# Patient Record
Sex: FEMALE | NOT HISPANIC OR LATINO | Employment: OTHER | URBAN - METROPOLITAN AREA
[De-identification: names, ages, dates, MRNs, and addresses within clinical notes are randomized per-mention and may not be internally consistent; named-entity substitution may affect disease eponyms.]

---

## 2017-07-07 ENCOUNTER — GENERIC CONVERSION - ENCOUNTER (OUTPATIENT)
Dept: OTHER | Facility: OTHER | Age: 82
End: 2017-07-07

## 2020-01-28 ENCOUNTER — HOSPITAL ENCOUNTER (INPATIENT)
Facility: HOSPITAL | Age: 85
LOS: 3 days | Discharge: HOME HEALTH | DRG: 884 | End: 2020-01-31
Attending: EMERGENCY MEDICINE | Admitting: HOSPITALIST
Payer: COMMERCIAL

## 2020-01-28 ENCOUNTER — APPOINTMENT (EMERGENCY)
Dept: RADIOLOGY | Facility: HOSPITAL | Age: 85
DRG: 884 | End: 2020-01-28
Payer: COMMERCIAL

## 2020-01-28 ENCOUNTER — APPOINTMENT (EMERGENCY)
Dept: CT IMAGING | Facility: HOSPITAL | Age: 85
DRG: 884 | End: 2020-01-28
Payer: COMMERCIAL

## 2020-01-28 DIAGNOSIS — R77.8 ELEVATED TROPONIN: ICD-10-CM

## 2020-01-28 DIAGNOSIS — R26.2 AMBULATORY DYSFUNCTION: ICD-10-CM

## 2020-01-28 DIAGNOSIS — R62.7 FAILURE TO THRIVE IN ADULT: ICD-10-CM

## 2020-01-28 DIAGNOSIS — E86.0 DEHYDRATION: ICD-10-CM

## 2020-01-28 DIAGNOSIS — R41.82 ALTERED MENTAL STATUS: Primary | ICD-10-CM

## 2020-01-28 PROBLEM — F03.90 DEMENTIA (HCC): Status: ACTIVE | Noted: 2020-01-28

## 2020-01-28 PROBLEM — I50.9 CHF (CONGESTIVE HEART FAILURE) (HCC): Status: ACTIVE | Noted: 2020-01-28

## 2020-01-28 PROBLEM — E11.9 DIABETES (HCC): Status: ACTIVE | Noted: 2020-01-28

## 2020-01-28 PROBLEM — I10 HTN (HYPERTENSION): Status: ACTIVE | Noted: 2020-01-28

## 2020-01-28 PROBLEM — N18.9 CKD (CHRONIC KIDNEY DISEASE): Status: ACTIVE | Noted: 2020-01-28

## 2020-01-28 LAB
ALBUMIN SERPL BCP-MCNC: 3.4 G/DL (ref 3.5–5)
ALP SERPL-CCNC: 71 U/L (ref 46–116)
ALT SERPL W P-5'-P-CCNC: 9 U/L (ref 12–78)
ANION GAP SERPL CALCULATED.3IONS-SCNC: 5 MMOL/L (ref 4–13)
APTT PPP: 22 SECONDS (ref 23–37)
AST SERPL W P-5'-P-CCNC: 16 U/L (ref 5–45)
ATRIAL RATE: 68 BPM
BASOPHILS # BLD AUTO: 0.02 THOUSANDS/ΜL (ref 0–0.1)
BASOPHILS NFR BLD AUTO: 0 % (ref 0–1)
BILIRUB SERPL-MCNC: 0.97 MG/DL (ref 0.2–1)
BUN SERPL-MCNC: 30 MG/DL (ref 5–25)
CALCIUM SERPL-MCNC: 9.8 MG/DL (ref 8.3–10.1)
CHLORIDE SERPL-SCNC: 103 MMOL/L (ref 100–108)
CO2 SERPL-SCNC: 32 MMOL/L (ref 21–32)
CREAT SERPL-MCNC: 1.07 MG/DL (ref 0.6–1.3)
EOSINOPHIL # BLD AUTO: 0.05 THOUSAND/ΜL (ref 0–0.61)
EOSINOPHIL NFR BLD AUTO: 1 % (ref 0–6)
ERYTHROCYTE [DISTWIDTH] IN BLOOD BY AUTOMATED COUNT: 12.4 % (ref 11.6–15.1)
GFR SERPL CREATININE-BSD FRML MDRD: 47 ML/MIN/1.73SQ M
GLUCOSE SERPL-MCNC: 198 MG/DL (ref 65–140)
GLUCOSE SERPL-MCNC: 216 MG/DL (ref 65–140)
HCT VFR BLD AUTO: 35.7 % (ref 34.8–46.1)
HGB BLD-MCNC: 11.8 G/DL (ref 11.5–15.4)
IMM GRANULOCYTES # BLD AUTO: 0.03 THOUSAND/UL (ref 0–0.2)
IMM GRANULOCYTES NFR BLD AUTO: 0 % (ref 0–2)
INR PPP: 1.02 (ref 0.84–1.19)
LYMPHOCYTES # BLD AUTO: 0.58 THOUSANDS/ΜL (ref 0.6–4.47)
LYMPHOCYTES NFR BLD AUTO: 8 % (ref 14–44)
MCH RBC QN AUTO: 32.4 PG (ref 26.8–34.3)
MCHC RBC AUTO-ENTMCNC: 33.1 G/DL (ref 31.4–37.4)
MCV RBC AUTO: 98 FL (ref 82–98)
MONOCYTES # BLD AUTO: 0.52 THOUSAND/ΜL (ref 0.17–1.22)
MONOCYTES NFR BLD AUTO: 7 % (ref 4–12)
NEUTROPHILS # BLD AUTO: 6.25 THOUSANDS/ΜL (ref 1.85–7.62)
NEUTS SEG NFR BLD AUTO: 84 % (ref 43–75)
NRBC BLD AUTO-RTO: 0 /100 WBCS
P AXIS: 36 DEGREES
PLATELET # BLD AUTO: 139 THOUSANDS/UL (ref 149–390)
PMV BLD AUTO: 10.5 FL (ref 8.9–12.7)
POTASSIUM SERPL-SCNC: 4 MMOL/L (ref 3.5–5.3)
PR INTERVAL: 122 MS
PROT SERPL-MCNC: 7.4 G/DL (ref 6.4–8.2)
PROTHROMBIN TIME: 13.5 SECONDS (ref 11.6–14.5)
QRS AXIS: -52 DEGREES
QRSD INTERVAL: 154 MS
QT INTERVAL: 432 MS
QTC INTERVAL: 459 MS
RBC # BLD AUTO: 3.64 MILLION/UL (ref 3.81–5.12)
SODIUM SERPL-SCNC: 140 MMOL/L (ref 136–145)
T WAVE AXIS: 141 DEGREES
TROPONIN I SERPL-MCNC: 0.08 NG/ML
TROPONIN I SERPL-MCNC: 0.1 NG/ML
VENTRICULAR RATE: 68 BPM
WBC # BLD AUTO: 7.45 THOUSAND/UL (ref 4.31–10.16)

## 2020-01-28 PROCEDURE — 71046 X-RAY EXAM CHEST 2 VIEWS: CPT

## 2020-01-28 PROCEDURE — 70450 CT HEAD/BRAIN W/O DYE: CPT

## 2020-01-28 PROCEDURE — 85610 PROTHROMBIN TIME: CPT | Performed by: EMERGENCY MEDICINE

## 2020-01-28 PROCEDURE — 99223 1ST HOSP IP/OBS HIGH 75: CPT | Performed by: PHYSICIAN ASSISTANT

## 2020-01-28 PROCEDURE — 36415 COLL VENOUS BLD VENIPUNCTURE: CPT | Performed by: EMERGENCY MEDICINE

## 2020-01-28 PROCEDURE — 93005 ELECTROCARDIOGRAM TRACING: CPT

## 2020-01-28 PROCEDURE — 96360 HYDRATION IV INFUSION INIT: CPT

## 2020-01-28 PROCEDURE — 82948 REAGENT STRIP/BLOOD GLUCOSE: CPT

## 2020-01-28 PROCEDURE — 85730 THROMBOPLASTIN TIME PARTIAL: CPT | Performed by: EMERGENCY MEDICINE

## 2020-01-28 PROCEDURE — 99285 EMERGENCY DEPT VISIT HI MDM: CPT | Performed by: EMERGENCY MEDICINE

## 2020-01-28 PROCEDURE — 80053 COMPREHEN METABOLIC PANEL: CPT | Performed by: EMERGENCY MEDICINE

## 2020-01-28 PROCEDURE — 99285 EMERGENCY DEPT VISIT HI MDM: CPT

## 2020-01-28 PROCEDURE — 84484 ASSAY OF TROPONIN QUANT: CPT | Performed by: PHYSICIAN ASSISTANT

## 2020-01-28 PROCEDURE — 85025 COMPLETE CBC W/AUTO DIFF WBC: CPT | Performed by: EMERGENCY MEDICINE

## 2020-01-28 PROCEDURE — 84484 ASSAY OF TROPONIN QUANT: CPT | Performed by: EMERGENCY MEDICINE

## 2020-01-28 PROCEDURE — 73610 X-RAY EXAM OF ANKLE: CPT

## 2020-01-28 PROCEDURE — 93010 ELECTROCARDIOGRAM REPORT: CPT | Performed by: INTERNAL MEDICINE

## 2020-01-28 RX ORDER — LEVOTHYROXINE SODIUM 0.05 MG/1
50 TABLET ORAL
Status: DISCONTINUED | OUTPATIENT
Start: 2020-01-29 | End: 2020-01-31 | Stop reason: HOSPADM

## 2020-01-28 RX ORDER — CYCLOSPORINE 0.5 MG/ML
1 EMULSION OPHTHALMIC 2 TIMES DAILY
COMMUNITY

## 2020-01-28 RX ORDER — DULOXETIN HYDROCHLORIDE 30 MG/1
30 CAPSULE, DELAYED RELEASE ORAL DAILY
Status: DISCONTINUED | OUTPATIENT
Start: 2020-01-29 | End: 2020-01-31 | Stop reason: HOSPADM

## 2020-01-28 RX ORDER — ATORVASTATIN CALCIUM 20 MG/1
20 TABLET, FILM COATED ORAL DAILY
COMMUNITY

## 2020-01-28 RX ORDER — LEVOTHYROXINE SODIUM 0.05 MG/1
50 TABLET ORAL DAILY
COMMUNITY

## 2020-01-28 RX ORDER — ATORVASTATIN CALCIUM 20 MG/1
20 TABLET, FILM COATED ORAL
Status: DISCONTINUED | OUTPATIENT
Start: 2020-01-29 | End: 2020-01-31 | Stop reason: HOSPADM

## 2020-01-28 RX ORDER — CARVEDILOL 3.12 MG/1
3.12 TABLET ORAL 2 TIMES DAILY WITH MEALS
COMMUNITY

## 2020-01-28 RX ORDER — ACETAMINOPHEN 325 MG/1
650 TABLET ORAL EVERY 6 HOURS PRN
Status: DISCONTINUED | OUTPATIENT
Start: 2020-01-28 | End: 2020-01-31 | Stop reason: HOSPADM

## 2020-01-28 RX ORDER — FERROUS SULFATE 325(65) MG
325 TABLET ORAL
COMMUNITY

## 2020-01-28 RX ORDER — ASPIRIN 81 MG/1
81 TABLET, CHEWABLE ORAL DAILY
COMMUNITY

## 2020-01-28 RX ORDER — ONDANSETRON 2 MG/ML
4 INJECTION INTRAMUSCULAR; INTRAVENOUS EVERY 6 HOURS PRN
Status: DISCONTINUED | OUTPATIENT
Start: 2020-01-28 | End: 2020-01-31 | Stop reason: HOSPADM

## 2020-01-28 RX ORDER — DULOXETIN HYDROCHLORIDE 30 MG/1
30 CAPSULE, DELAYED RELEASE ORAL DAILY
COMMUNITY

## 2020-01-28 RX ORDER — CALCITRIOL 0.25 UG/1
0.25 CAPSULE, LIQUID FILLED ORAL 3 TIMES WEEKLY
COMMUNITY

## 2020-01-28 RX ORDER — PANTOPRAZOLE SODIUM 20 MG/1
20 TABLET, DELAYED RELEASE ORAL
Status: DISCONTINUED | OUTPATIENT
Start: 2020-01-29 | End: 2020-01-31 | Stop reason: HOSPADM

## 2020-01-28 RX ORDER — CALCITRIOL 0.25 UG/1
0.25 CAPSULE, LIQUID FILLED ORAL 3 TIMES WEEKLY
Status: DISCONTINUED | OUTPATIENT
Start: 2020-01-29 | End: 2020-01-31 | Stop reason: HOSPADM

## 2020-01-28 RX ORDER — ASPIRIN 81 MG/1
81 TABLET, CHEWABLE ORAL DAILY
Status: DISCONTINUED | OUTPATIENT
Start: 2020-01-29 | End: 2020-01-31 | Stop reason: HOSPADM

## 2020-01-28 RX ORDER — FERROUS SULFATE 325(65) MG
325 TABLET ORAL
Status: DISCONTINUED | OUTPATIENT
Start: 2020-01-28 | End: 2020-01-31 | Stop reason: HOSPADM

## 2020-01-28 RX ORDER — CARVEDILOL 3.12 MG/1
3.12 TABLET ORAL 2 TIMES DAILY WITH MEALS
Status: DISCONTINUED | OUTPATIENT
Start: 2020-01-29 | End: 2020-01-31 | Stop reason: HOSPADM

## 2020-01-28 RX ORDER — OMEPRAZOLE 20 MG/1
20 CAPSULE, DELAYED RELEASE ORAL
COMMUNITY

## 2020-01-28 RX ORDER — FUROSEMIDE 10 MG/ML
SOLUTION ORAL 3 TIMES WEEKLY
COMMUNITY
End: 2020-01-31 | Stop reason: HOSPADM

## 2020-01-28 RX ADMIN — DEXTRAN 70 AND HYPROMELLOSE 2910 1 DROP: 1; 3 SOLUTION/ DROPS OPHTHALMIC at 22:00

## 2020-01-28 RX ADMIN — FERROUS SULFATE TAB 325 MG (65 MG ELEMENTAL FE) 325 MG: 325 (65 FE) TAB at 22:44

## 2020-01-28 RX ADMIN — SODIUM CHLORIDE 500 ML: 0.9 INJECTION, SOLUTION INTRAVENOUS at 19:20

## 2020-01-29 LAB
ANION GAP SERPL CALCULATED.3IONS-SCNC: 8 MMOL/L (ref 4–13)
ATRIAL RATE: 66 BPM
BUN SERPL-MCNC: 26 MG/DL (ref 5–25)
CALCIUM SERPL-MCNC: 9.5 MG/DL (ref 8.3–10.1)
CHLORIDE SERPL-SCNC: 106 MMOL/L (ref 100–108)
CO2 SERPL-SCNC: 33 MMOL/L (ref 21–32)
CREAT SERPL-MCNC: 1 MG/DL (ref 0.6–1.3)
ERYTHROCYTE [DISTWIDTH] IN BLOOD BY AUTOMATED COUNT: 12.3 % (ref 11.6–15.1)
EST. AVERAGE GLUCOSE BLD GHB EST-MCNC: 117 MG/DL
GFR SERPL CREATININE-BSD FRML MDRD: 51 ML/MIN/1.73SQ M
GLUCOSE SERPL-MCNC: 116 MG/DL (ref 65–140)
GLUCOSE SERPL-MCNC: 130 MG/DL (ref 65–140)
GLUCOSE SERPL-MCNC: 133 MG/DL (ref 65–140)
GLUCOSE SERPL-MCNC: 136 MG/DL (ref 65–140)
GLUCOSE SERPL-MCNC: 205 MG/DL (ref 65–140)
GLUCOSE SERPL-MCNC: 430 MG/DL (ref 65–140)
GLUCOSE SERPL-MCNC: 93 MG/DL (ref 65–140)
HBA1C MFR BLD: 5.7 % (ref 4.2–6.3)
HCT VFR BLD AUTO: 32.8 % (ref 34.8–46.1)
HGB BLD-MCNC: 10.7 G/DL (ref 11.5–15.4)
MCH RBC QN AUTO: 31.9 PG (ref 26.8–34.3)
MCHC RBC AUTO-ENTMCNC: 32.6 G/DL (ref 31.4–37.4)
MCV RBC AUTO: 98 FL (ref 82–98)
P AXIS: 64 DEGREES
PLATELET # BLD AUTO: 118 THOUSANDS/UL (ref 149–390)
PMV BLD AUTO: 10.2 FL (ref 8.9–12.7)
POTASSIUM SERPL-SCNC: 3.5 MMOL/L (ref 3.5–5.3)
PR INTERVAL: 122 MS
QRS AXIS: -60 DEGREES
QRSD INTERVAL: 154 MS
QT INTERVAL: 472 MS
QTC INTERVAL: 494 MS
RBC # BLD AUTO: 3.35 MILLION/UL (ref 3.81–5.12)
SODIUM SERPL-SCNC: 147 MMOL/L (ref 136–145)
T WAVE AXIS: 121 DEGREES
TROPONIN I SERPL-MCNC: 0.09 NG/ML
TSH SERPL DL<=0.05 MIU/L-ACNC: 0.41 UIU/ML (ref 0.36–3.74)
VENTRICULAR RATE: 66 BPM
WBC # BLD AUTO: 6.22 THOUSAND/UL (ref 4.31–10.16)

## 2020-01-29 PROCEDURE — 80048 BASIC METABOLIC PNL TOTAL CA: CPT | Performed by: STUDENT IN AN ORGANIZED HEALTH CARE EDUCATION/TRAINING PROGRAM

## 2020-01-29 PROCEDURE — 92610 EVALUATE SWALLOWING FUNCTION: CPT

## 2020-01-29 PROCEDURE — 99232 SBSQ HOSP IP/OBS MODERATE 35: CPT | Performed by: HOSPITALIST

## 2020-01-29 PROCEDURE — 97167 OT EVAL HIGH COMPLEX 60 MIN: CPT

## 2020-01-29 PROCEDURE — 82948 REAGENT STRIP/BLOOD GLUCOSE: CPT

## 2020-01-29 PROCEDURE — 97163 PT EVAL HIGH COMPLEX 45 MIN: CPT

## 2020-01-29 PROCEDURE — 85027 COMPLETE CBC AUTOMATED: CPT | Performed by: STUDENT IN AN ORGANIZED HEALTH CARE EDUCATION/TRAINING PROGRAM

## 2020-01-29 PROCEDURE — 99223 1ST HOSP IP/OBS HIGH 75: CPT | Performed by: INTERNAL MEDICINE

## 2020-01-29 PROCEDURE — 83036 HEMOGLOBIN GLYCOSYLATED A1C: CPT | Performed by: STUDENT IN AN ORGANIZED HEALTH CARE EDUCATION/TRAINING PROGRAM

## 2020-01-29 PROCEDURE — 93010 ELECTROCARDIOGRAM REPORT: CPT | Performed by: INTERNAL MEDICINE

## 2020-01-29 PROCEDURE — 84443 ASSAY THYROID STIM HORMONE: CPT | Performed by: STUDENT IN AN ORGANIZED HEALTH CARE EDUCATION/TRAINING PROGRAM

## 2020-01-29 PROCEDURE — 84484 ASSAY OF TROPONIN QUANT: CPT | Performed by: PHYSICIAN ASSISTANT

## 2020-01-29 PROCEDURE — 97535 SELF CARE MNGMENT TRAINING: CPT

## 2020-01-29 RX ADMIN — DEXTRAN 70 AND HYPROMELLOSE 2910 1 DROP: 1; 3 SOLUTION/ DROPS OPHTHALMIC at 08:53

## 2020-01-29 RX ADMIN — ENOXAPARIN SODIUM 40 MG: 40 INJECTION SUBCUTANEOUS at 08:53

## 2020-01-29 RX ADMIN — PANTOPRAZOLE SODIUM 20 MG: 20 TABLET, DELAYED RELEASE ORAL at 05:53

## 2020-01-29 RX ADMIN — INSULIN LISPRO 1 UNITS: 100 INJECTION, SOLUTION INTRAVENOUS; SUBCUTANEOUS at 12:42

## 2020-01-29 RX ADMIN — LEVOTHYROXINE SODIUM 50 MCG: 50 TABLET ORAL at 05:51

## 2020-01-29 NOTE — ASSESSMENT & PLAN NOTE
Lab Results   Component Value Date    HGBA1C 6 7 (H) 11/05/2014       Recent Labs     01/28/20  1823   POCGLU 198*       Blood Sugar Average: Last 72 hrs:  (P) 198     Order accuchecks with sliding scale  Watch closely for hypoglycemia with recent decreased PO intake

## 2020-01-29 NOTE — ED NOTES
SLIM Provider at bedside speaking with patient and patient's family (Daughter)     James Mari RN  01/28/20 2017

## 2020-01-29 NOTE — PLAN OF CARE
Problem: PHYSICAL THERAPY ADULT  Goal: Performs mobility at highest level of function for planned discharge setting  See evaluation for individualized goals  Description  Treatment/Interventions: Functional transfer training, LE strengthening/ROM, Elevations, Therapeutic exercise, Endurance training, Patient/family training, Equipment eval/education, Bed mobility, Gait training, Compensatory technique education, Continued evaluation, Spoke to nursing, OT  Equipment Recommended: (monitor)       See flowsheet documentation for full assessment, interventions and recommendations  Note:   Prognosis: Guarded  Problem List: Decreased strength, Decreased range of motion, Decreased endurance, Impaired balance, Decreased mobility, Decreased cognition, Decreased coordination, Impaired judgement, Decreased safety awareness, Impaired hearing, Pain, Decreased skin integrity  Assessment: Pt is 81 y/o female admitted with change in mental status, weakness, decreased appetite  Failure to thrive  PT consulted  Up with assist orders  Prior to admission resides with daughter and son in law in split level home with 4 steps between each floor  Up until Last Wednesday leading to admission significant decline in mobility to being unable to ambulation  Prior was ambulating in home with RW support unassisted  Able to negotiate stairs  A for ADLS  Hx of 2 falls  Ambulated approx 100 ft/day  Now with significant functional limitations observed given impaired cognition, decreased activity tolerance, balance, strength and overall mobility  Max A of 2 for bed mobility and transfers sit to stand  Standing tolerance with RW limited to less than 5 seconds  Heavily retropulsive in standing with LE tremulousness observed  Appears fearful with attempt at mobility  Unable to progress with ambulation at this time given weakness  Would recommend continued PT in order to optimize functional outcomes while lessening caregiver assistance  Rehab would be beneficial given significance of impairments, however family preference to care for pt in home  Will follow and progress towards goals  Barriers to Discharge: Inaccessible home environment  Barriers to Discharge Comments: can have first floor set up  Recommendation: Short-term skilled PT(rec continued rehab  Family preference home  )     PT - OK to Discharge: No(to home, yes if to rhb )    See flowsheet documentation for full assessment

## 2020-01-29 NOTE — PLAN OF CARE
Problem: OCCUPATIONAL THERAPY ADULT  Goal: Performs self-care activities at highest level of function for planned discharge setting  See evaluation for individualized goals  Description  Treatment Interventions: ADL retraining, Functional transfer training, UE strengthening/ROM, Endurance training, Cognitive reorientation, Patient/family training, Fine motor coordination activities, Compensatory technique education, Equipment evaluation/education, Activityengagement          See flowsheet documentation for full assessment, interventions and recommendations  Note:   Limitation: Decreased ADL status, Decreased UE strength, Decreased Safe judgement during ADL, Decreased cognition, Decreased UE ROM, Decreased endurance, Visual deficit, Decreased fine motor control, Decreased high-level ADLs, Decreased self-care trans  Prognosis: Fair  Assessment: Pt is an 81 y/o F admitted to the hospital 2* symptoms of right sided lean, altered mental status, and inability to get out of bed  Pt noted with elevated troponins  Pt with a PMH of cognitive decline, falls (with rib fractures), and multiple hospitalizations 2* failure to thrive  PTA family (i e  daughter) states pt had assistance with her ADLs; family states pt was I with her transfers and ambulation- with RW; daughter states recent decline (since last wednesday) in function after her fall; HHA 2x/wk; daughter plans to provide 24 hour care upon discharge  During initial eval, pt demonstrated deficits with her funcitonal balance, funcitonal mobility, ADL status, transfer safety, B/L UE strength, functional activity tolerance (current = poor 5-10 minutes), and cognition (i e  orientation, problem solving, judgment and safety, and direction following)  Pt would benefit frrom continued OT tx for the above stated deficits 3-5 x/wk for 1-2 wks        OT Discharge Recommendation: Short Term Rehab(Daughter prefers home with increased home services)

## 2020-01-29 NOTE — HOSPICE NOTE
Hospice referral received  Spoke with Dr Roberto Carlos Hall and MADAY Jordan  Will reach out to patient's daughter  Thank you

## 2020-01-29 NOTE — ED PROVIDER NOTES
History  Chief Complaint   Patient presents with    Altered Mental Status     Per EMS,patient was sent from home  Family reports that the patient has not eaten today and appears to be more confused than her baseline, hx  of dementia  Family reports fall one week ago  History provided by:  Patient   used: No    Altered Mental Status   Presenting symptoms: confusion    Severity:  Moderate  Most recent episode:  More than 2 days ago  Episode history:  Multiple  Duration:  1 week  Timing:  Intermittent  Progression:  Worsening  Chronicity:  New  Context: dementia    Context: not nursing home resident    Associated symptoms: no abdominal pain, no agitation, no fever, no headaches, no light-headedness, no nausea, no rash, no slurred speech and no vomiting        Prior to Admission Medications   Prescriptions Last Dose Informant Patient Reported? Taking?    DULoxetine (CYMBALTA) 30 mg delayed release capsule   Yes Yes   Sig: Take 30 mg by mouth daily   aspirin 81 mg chewable tablet   Yes Yes   Sig: Chew 81 mg daily   atorvastatin (LIPITOR) 20 mg tablet   Yes Yes   Sig: Take 20 mg by mouth daily   calcitriol (ROCALTROL) 0 25 mcg capsule   Yes Yes   Sig: Take 0 25 mcg by mouth 3 (three) times a week   carvedilol (COREG) 3 125 mg tablet   Yes Yes   Sig: Take 3 125 mg by mouth 2 (two) times a day with meals   cycloSPORINE (RESTASIS) 0 05 % ophthalmic emulsion   Yes Yes   Si drop 2 (two) times a day   ferrous sulfate 325 (65 Fe) mg tablet   Yes Yes   Sig: Take 325 mg by mouth daily at bedtime   furosemide (LASIX) 10 mg/mL oral solution   Yes Yes   Sig: Take by mouth 3 (three) times a week   levothyroxine 50 mcg tablet   Yes Yes   Sig: Take 50 mcg by mouth daily   magnesium oxide (MAG-OX) 400 mg   Yes Yes   Sig: Take 400 mg by mouth daily after breakfast   omeprazole (PriLOSEC) 20 mg delayed release capsule   Yes Yes   Sig: Take 20 mg by mouth every other day   sitaGLIPtin (JANUVIA) 50 mg tablet Yes Yes   Sig: Take 50 mg by mouth daily      Facility-Administered Medications: None       Past Medical History:   Diagnosis Date    Pacemaker        History reviewed  No pertinent surgical history  History reviewed  No pertinent family history  I have reviewed and agree with the history as documented  Social History     Tobacco Use    Smoking status: Never Smoker    Smokeless tobacco: Never Used   Substance Use Topics    Alcohol use: Never     Frequency: Never    Drug use: Never        Review of Systems   Constitutional: Negative for chills and fever  HENT: Negative for facial swelling, sore throat and trouble swallowing  Eyes: Negative for pain and visual disturbance  Respiratory: Negative for cough and shortness of breath  Cardiovascular: Negative for chest pain and leg swelling  Gastrointestinal: Negative for abdominal pain, blood in stool, diarrhea, nausea and vomiting  Genitourinary: Negative for dysuria and flank pain  Musculoskeletal: Negative for back pain, neck pain and neck stiffness  Skin: Negative for pallor and rash  Allergic/Immunologic: Negative for environmental allergies and immunocompromised state  Neurological: Negative for dizziness, light-headedness and headaches  Hematological: Negative for adenopathy  Does not bruise/bleed easily  Psychiatric/Behavioral: Positive for confusion  Negative for agitation and behavioral problems  All other systems reviewed and are negative  Physical Exam  Physical Exam   Constitutional: She is oriented to person, place, and time  She appears well-developed and well-nourished  No distress  HENT:   Head: Normocephalic and atraumatic  Eyes: EOM are normal    Neck: Normal range of motion  Neck supple  Cardiovascular: Normal rate, regular rhythm, normal heart sounds and intact distal pulses  Pulmonary/Chest: Effort normal and breath sounds normal    Abdominal: Soft  Bowel sounds are normal  There is no tenderness  There is no rebound and no guarding  Musculoskeletal: Normal range of motion  Neurological: She is alert and oriented to person, place, and time  No cranial nerve deficit  Coordination normal    Skin: Skin is warm and dry  Psychiatric: She has a normal mood and affect  Nursing note and vitals reviewed        Vital Signs  ED Triage Vitals [01/28/20 1736]   Temperature Pulse Respirations Blood Pressure SpO2   97 6 °F (36 4 °C) 67 16 127/71 (!) 88 %      Temp Source Heart Rate Source Patient Position - Orthostatic VS BP Location FiO2 (%)   Oral Monitor Lying Right arm --      Pain Score       No Pain           Vitals:    01/28/20 1736 01/28/20 2037 01/28/20 2114   BP: 127/71 (!) 183/74 (!) 175/74   Pulse: 67 68 59   Patient Position - Orthostatic VS: Lying Lying          Visual Acuity  Visual Acuity      Most Recent Value   L Pupil Size (mm)  2   R Pupil Size (mm)  2          ED Medications  Medications   aspirin chewable tablet 81 mg (has no administration in time range)   atorvastatin (LIPITOR) tablet 20 mg (has no administration in time range)   calcitriol (ROCALTROL) capsule 0 25 mcg (has no administration in time range)   carvedilol (COREG) tablet 3 125 mg (has no administration in time range)   dextran 70-hypromellose (GENTEAL TEARS) 0 1-0 3 % ophthalmic solution 1 drop (has no administration in time range)   DULoxetine (CYMBALTA) delayed release capsule 30 mg (has no administration in time range)   ferrous sulfate tablet 325 mg (has no administration in time range)   levothyroxine tablet 50 mcg (has no administration in time range)   magnesium oxide (MAG-OX) tablet 400 mg (has no administration in time range)   pantoprazole (PROTONIX) EC tablet 20 mg (has no administration in time range)   ondansetron (ZOFRAN) injection 4 mg (has no administration in time range)   enoxaparin (LOVENOX) subcutaneous injection 40 mg (has no administration in time range)   acetaminophen (TYLENOL) tablet 650 mg (has no administration in time range)   insulin lispro (HumaLOG) 100 units/mL subcutaneous injection 1-5 Units (has no administration in time range)   insulin lispro (HumaLOG) 100 units/mL subcutaneous injection 1-5 Units (has no administration in time range)   sodium chloride 0 9 % bolus 500 mL (500 mL Intravenous New Bag 1/28/20 1920)       Diagnostic Studies  Results Reviewed     Procedure Component Value Units Date/Time    Protime-INR [[id number]]  (Normal) Collected:  01/28/20 1836    Lab Status:  Final result Specimen:  Blood from Arm, Left Updated:  01/28/20 1910     Protime 13 5 seconds      INR 1 02    APTT [[id number]]  (Abnormal) Collected:  01/28/20 1836    Lab Status:  Final result Specimen:  Blood from Arm, Left Updated:  01/28/20 1910     PTT 22 seconds     Comprehensive metabolic panel [[id number]]  (Abnormal) Collected:  01/28/20 1836    Lab Status:  Final result Specimen:  Blood from Arm, Left Updated:  01/28/20 1901     Sodium 140 mmol/L      Potassium 4 0 mmol/L      Chloride 103 mmol/L      CO2 32 mmol/L      ANION GAP 5 mmol/L      BUN 30 mg/dL      Creatinine 1 07 mg/dL      Glucose 216 mg/dL      Calcium 9 8 mg/dL      AST 16 U/L      ALT 9 U/L      Alkaline Phosphatase 71 U/L      Total Protein 7 4 g/dL      Albumin 3 4 g/dL      Total Bilirubin 0 97 mg/dL      eGFR 47 ml/min/1 73sq m     Narrative:       Tyrone guidelines for Chronic Kidney Disease (CKD):     Stage 1 with normal or high GFR (GFR > 90 mL/min/1 73 square meters)    Stage 2 Mild CKD (GFR = 60-89 mL/min/1 73 square meters)    Stage 3A Moderate CKD (GFR = 45-59 mL/min/1 73 square meters)    Stage 3B Moderate CKD (GFR = 30-44 mL/min/1 73 square meters)    Stage 4 Severe CKD (GFR = 15-29 mL/min/1 73 square meters)    Stage 5 End Stage CKD (GFR <15 mL/min/1 73 square meters)  Note: GFR calculation is accurate only with a steady state creatinine    Troponin I [[id number]]  (Abnormal) Collected:  01/28/20 1836 Lab Status:  Final result Specimen:  Blood from Arm, Left Updated:  01/28/20 1900     Troponin I 0 08 ng/mL     CBC and differential [[id number]]  (Abnormal) Collected:  01/28/20 1836    Lab Status:  Final result Specimen:  Blood from Arm, Left Updated:  01/28/20 1841     WBC 7 45 Thousand/uL      RBC 3 64 Million/uL      Hemoglobin 11 8 g/dL      Hematocrit 35 7 %      MCV 98 fL      MCH 32 4 pg      MCHC 33 1 g/dL      RDW 12 4 %      MPV 10 5 fL      Platelets 790 Thousands/uL      nRBC 0 /100 WBCs      Neutrophils Relative 84 %      Immat GRANS % 0 %      Lymphocytes Relative 8 %      Monocytes Relative 7 %      Eosinophils Relative 1 %      Basophils Relative 0 %      Neutrophils Absolute 6 25 Thousands/µL      Immature Grans Absolute 0 03 Thousand/uL      Lymphocytes Absolute 0 58 Thousands/µL      Monocytes Absolute 0 52 Thousand/µL      Eosinophils Absolute 0 05 Thousand/µL      Basophils Absolute 0 02 Thousands/µL     Fingerstick Glucose (POCT) [[id number]]  (Abnormal) Collected:  01/28/20 1823    Lab Status:  Final result Updated:  01/28/20 1825     POC Glucose 198 mg/dl     POCT urinalysis dipstick [[id number]]     Lab Status:  No result Specimen:  Urine                  CT head without contrast   Final Result by Anup 6, DO (01/28 1938)      No acute intracranial abnormality  Microangiopathic changes  Workstation performed: [id number]         XR chest 2 views    (Results Pending)   XR ankle 3+ vw right    (Results Pending)   X-rays reviewed, no significant acute abnormality noted, report pending             Procedures  ECG 12 Lead Documentation Only  Date/Time: 1/28/2020 7:25 PM  Performed by: Hilda Reyes MD  Authorized by: Hilda Reyes MD     Indications / Diagnosis:  Confusion  ECG reviewed by me, the ED Provider: yes    Patient location:  ED  Interpretation:     Interpretation: normal    Rate:     ECG rate assessment: normal    Rhythm:     Rhythm: sinus rhythm Ectopy:     Ectopy: none    QRS:     QRS axis:  Normal  Conduction:     Conduction: normal    ST segments:     ST segments:  Normal  T waves:     T waves: normal               ED Course  ED Course as of Jan 28 2143 Tue Jan 28, 2020 1918 Positive trop noted  Troponin I(!): 0 08   1923 Chest x-ray and x-ray ankle reviewed, no acute abnormality noted, known right-sided rib fractures noted  MDM  Number of Diagnoses or Management Options  Altered mental status: new and requires workup  Ambulatory dysfunction: new and requires workup  Dehydration: new and requires workup  Elevated troponin: new and requires workup  Failure to thrive in adult: new and requires workup  Diagnosis management comments: 30-year-old female, brought in from home, complaints of confusion, generalized weakness, failure to thrive, loss of appetite, fall down a few days back, has known right-sided fractures; patient and family denies headache, chest pain, dyspnea, fever or any other illness; however concerned about dehydration due to not eating drinking  On exam patient is conscious, alert, no focal neuro deficits, vital signs stable, afebrile, lungs clear, heart sounds normal, abdomen soft nontender  Differential diagnosis:  Failure to thrive, rule out dehydration, rule out intracranial hemorrhage due to recent fall, UTI, electrolyte imbalance  Will check labs, EKG, CT head, chest x-ray, patient also has reported right ankle swelling, will get x-ray  Amount and/or Complexity of Data Reviewed  Clinical lab tests: reviewed and ordered  Tests in the radiology section of CPT®: ordered and reviewed  Tests in the medicine section of CPT®: ordered and reviewed  Discuss the patient with other providers: yes  Independent visualization of images, tracings, or specimens: yes          Disposition  Final diagnoses:    Altered mental status   Failure to thrive in adult   Dehydration   Ambulatory dysfunction Elevated troponin     Time reflects when diagnosis was documented in both MDM as applicable and the Disposition within this note     Time User Action Codes Description Comment    1/28/2020  7:38 PM Ryan Antoni Add [R41 82] Altered mental status     1/28/2020  7:38 PM Ryan Antoni Add [R62 51] Failure to thrive (0-17)     1/28/2020  7:38 PM Ryan Antoni Remove [R62 51] Failure to thrive (0-17)     1/28/2020  7:38 PM Ryan Antoni Add [R62 7] Failure to thrive in adult     1/28/2020  7:38 PM Ryan Antoni Add [E86 0] Dehydration     1/28/2020  7:38 PM Ryan Antoni Add [R26 2] Ambulatory dysfunction     1/28/2020  9:12 PM Shmuel SNYDER Modify [R41 82] Altered mental status     1/28/2020  9:43 PM Ryan Antoni Add [R79 89] Elevated troponin       ED Disposition     ED Disposition Condition Date/Time Comment    Admit Stable Tue Jan 28, 2020  7:54 PM Case was discussed with Dr Austin Slater and the patient's admission status was agreed to be Admission Status: inpatient status to the service of Dr Austin Slater         Follow-up Information    None         Current Discharge Medication List      CONTINUE these medications which have NOT CHANGED    Details   aspirin 81 mg chewable tablet Chew 81 mg daily      atorvastatin (LIPITOR) 20 mg tablet Take 20 mg by mouth daily      calcitriol (ROCALTROL) 0 25 mcg capsule Take 0 25 mcg by mouth 3 (three) times a week      carvedilol (COREG) 3 125 mg tablet Take 3 125 mg by mouth 2 (two) times a day with meals      cycloSPORINE (RESTASIS) 0 05 % ophthalmic emulsion 1 drop 2 (two) times a day      DULoxetine (CYMBALTA) 30 mg delayed release capsule Take 30 mg by mouth daily      ferrous sulfate 325 (65 Fe) mg tablet Take 325 mg by mouth daily at bedtime      furosemide (LASIX) 10 mg/mL oral solution Take by mouth 3 (three) times a week      levothyroxine 50 mcg tablet Take 50 mcg by mouth daily      magnesium oxide (MAG-OX) 400 mg Take 400 mg by mouth daily after breakfast omeprazole (PriLOSEC) 20 mg delayed release capsule Take 20 mg by mouth every other day      sitaGLIPtin (JANUVIA) 50 mg tablet Take 50 mg by mouth daily           No discharge procedures on file      ED Provider  Electronically Signed by           Jemal Gibbs MD  01/28/20 8663

## 2020-01-29 NOTE — SOCIAL WORK
Cm notified by palliative care daughter/POA requests OP Hospice eval by [facility]- referral made  Will follow up with recommendation

## 2020-01-29 NOTE — PROGRESS NOTES
Progress Note - Ian Kelsey 1933, 80 y o  female MRN: [id number]    Unit/Bed#: [facility] 68 2 -02 Encounter: [id number]    Primary Care Provider: No primary care provider on file  Date and time admitted to hospital: 2020  5:25 PM        * Altered mental status  Assessment & Plan  Likely worsening dementia and dehydration  Looking at hospice          Subjective:   No complaints  Just tired  No appetite  Objective:     Vitals:   Temp (24hrs), Av 9 °F (36 6 °C), Min:97 6 °F (36 4 °C), Max:98 3 °F (36 8 °C)    Temp:  [97 6 °F (36 4 °C)-98 3 °F (36 8 °C)] 98 °F (36 7 °C)  HR:  [59-70] 63  Resp:  [16-18] 16  BP: (127-183)/(61-74) 156/65  SpO2:  [88 %-100 %] 96 %  There is no height or weight on file to calculate BMI  Input and Output Summary (last 24 hours):     No intake or output data in the 24 hours ending 20 1604    Physical Exam:     Physical Exam   HENT:   Head: Normocephalic and atraumatic  Eyes: Pupils are equal, round, and reactive to light  EOM are normal    Cardiovascular: Normal rate and regular rhythm  Exam reveals no gallop and no friction rub  No murmur heard  Pulmonary/Chest: Effort normal and breath sounds normal  She has no wheezes  She has no rales  Abdominal: Soft  Bowel sounds are normal  There is no tenderness  Musculoskeletal: She exhibits no edema  Nursing note and vitals reviewed              Additional Data:     Labs:    Results from last 7 days   Lab Units 20  0342 20  1836   WBC Thousand/uL 6 22 7 45   HEMOGLOBIN g/dL 10 7* 11 8   HEMATOCRIT % 32 8* 35 7   PLATELETS Thousands/uL 118* 139*   NEUTROS PCT %  --  84*   LYMPHS PCT %  --  8*   MONOS PCT %  --  7   EOS PCT %  --  1     Results from last 7 days   Lab Units 20  0342 20  1836   POTASSIUM mmol/L 3 5 4 0   CHLORIDE mmol/L 106 103   CO2 mmol/L 33* 32   BUN mg/dL 26* 30*   CREATININE mg/dL 1 00 1 07   CALCIUM mg/dL 9 5 9 8   ALK PHOS U/L  --  71   ALT U/L  --  9* AST U/L  --  16     Results from last 7 days   Lab Units 01/28/20  1836   INR  1 02     Results from last 7 days   Lab Units 01/29/20  1555 01/29/20  1112 01/29/20  1056 01/29/20  0708 01/28/20  2236 01/28/20  1823   POC GLUCOSE mg/dl 130 205* 430* 136 116 198*     Results from last 7 days   Lab Units 01/29/20  0345   HEMOGLOBIN A1C % 5 7           * I Have Reviewed All Lab Data     Recent Cultures (last 7 days):             Last 24 Hours Medication List:     Current Facility-Administered Medications:  acetaminophen 650 mg Oral Q6H PRN Sharmon Glaze, PA-C   aspirin 81 mg Oral Daily Sharmon Glaze, PA-C   atorvastatin 20 mg Oral Daily With [employer], PA-C   calcitriol 0 25 mcg Oral Once per day on Mon Wed Fri Sharmon Glaze, PA-C   carvedilol 3 125 mg Oral BID With Meals Sharmon Glaze, PA-C   dextran 70-hypromellose 1 drop Both Eyes Q12H [facility] Sharmon Glaze, PA-C   DULoxetine 30 mg Oral Daily Sharmon Glaze, PA-C   enoxaparin 40 mg Subcutaneous Daily Sharmon Glaze, PA-C   ferrous sulfate 325 mg Oral HS Sharmon Glaze, PA-C   insulin lispro 1-5 Units Subcutaneous TID AC Elisabeth Calvo, PA-C   insulin lispro 1-5 Units Subcutaneous HS Sharmon Glaze, PA-C   levothyroxine 50 mcg Oral Early Morning Sharmon Glaze, PA-C   magnesium oxide 400 mg Oral After Breakfast Sharmon Glaze, PA-C   ondansetron 4 mg Intravenous Q6H PRN Sharmon Glaze, PA-C   pantoprazole 20 mg Oral Early Morning Sharmon Glaze, PA-C         VTE Pharmacologic Prophylaxis:   Pharmacologic: Enoxaparin (Lovenox)      Current Length of Stay: 1 day(s)    Current Patient Status: Inpatient       Discharge Plan: hospice    Code Status: Level 3 - DNAR and DNI           Today, Patient Was Seen By: Sonia Heard DO    ** Please Note: Dictation voice to text software may have been used in the creation of this document   **

## 2020-01-29 NOTE — SPEECH THERAPY NOTE
Speech Language/Pathology  Speech/Language Pathology  Assessment    Patient Name: Calvin Garnett  YCMOQ'K Date: 1/29/2020     Problem List  Principal Problem:    Altered mental status  Active Problems:    CHF (congestive heart failure) (HCC)    CKD (chronic kidney disease)    HTN (hypertension)    Dementia ([facility] [address] )    Diabetes ([facility] [address] )    Past Medical History  Past Medical History:   Diagnosis Date    Pacemaker      Past Surgical History  History reviewed  No pertinent surgical history  Bedside Swallow Evaluation:    Summary:  Pt presents w/ limited verbalizations  Alert but did not always respond to questions  Tolerated small amts jello, puree, nectar, and controlled small sips of water wfl  Inconsistent w/ use of straw  Tends to hold the bolus in her mouth  Needs cues to swallow and visual cue of next presentation coming towards her lips to help elicit a transfer and swallow  Noted the center of her tongue appeared black  nurse reported no iron or dark meds given here  Recommendations:  Diet: puree  Liquid: nectar  Meds: crush  Supervision:  Positioning:Upright  Strategies: Pt to take PO/Meds only when fully alert and upright  Make sure she swallows after each bite  Foods should be flavorful and warm or cold  Not room temp  Oral care  Aspiration precautions  Reflux precautions  Moderate hiatal hernia per CT at [facility]    Therapy Prognosis: unknown  Prognosis considerations: ? Decline due to dementia  Frequency: 2-5x/week    Goal(s):  Pt will tolerate least restrictive diet w/out s/s aspiration or oral/pharyngeal difficulties  Pt will transfer and swallow each bolus in ,<15 seconds w/ tactile, visual cues and gustatory stim  Pt will tolerate nectar liquids-->thin  liquids w/out s/s aspiration       Patient's goal:unable to state    Consider consult w/:  Nutrition, supplements    Reason for consult:  R/o aspiration  Determine safest and least restrictive diet  H/o neurological disease  Nursing reported cough w/ water  poor intake  weight loss   h/o dysphagia   Current diet:  none  Premorbid diet[de-identified]  Diabetic diet w/ no texture modification per d/c report from [facility] 1/14/20  Previous VBS:  None known  O2 requirement:  none  Voice/Speech:  Minimal speech was clear  "I don't like that"  Social:  Home w/ daughter and kavin  Follows commands:  Inconsistent w/ basic commands  Cognitive Status:  Alert, confused  Oral [facility] exam:  Dentition: partial natural  Partial upper plate  Labial strength and ROM:adequate  Lingual strength and ROM:adequate  Mandibular strength and ROM: reduced w/ jello  Secretion management:wnl  Volitional cough:wnl    Items administered:  Noted above  Oral stage: mod  Lip closure:inconsistent w/ straw use  Mastication:reduced w/ jello  Bolus formation:fair  Bolus control:appeared adequate today  Transfer:variable delay  Prompt to > 20 seconds  Oral residue:none visible  Pocketing:none    Pharyngeal stage: ? Mild  Swallow promptness: prompt to mild delay  Laryngeal rise:fair  Wet voice:no  Throat clear:no  Cough:no  Secondary swallows:none  Audible swallows:no  No overt s/s aspiration    Esophageal stage: Mod hiatal hernia per [facility] report  Aspiration precautions posted    Results d/w:  Pt, nursing                      Per H&P  Chief Complaint:   Failure to thrive  History of Present Illness:  Gilford Moan is a 80 y o  female who presents with failure to thrive  Patient was brought in by daughter and son in law  Patient normally follows at [address] and had 2 recent hospitalizations there for similar complaints  Complaints include: generalized weakness, worsening confusion, decreased appetite  Family states they brought her here because they thought the wait in the ED would be shorter and in the past she received kind care from the nurses here  Daughter states that although the symptoms are the same, they seem to be getting worse  Today patient was unable to get out of bed   She kept leaning to the right or backward because she was too weak to sit up  She seemed to confused or unable to focus enough to eat  She was just letting the food sit in her mouth  She did fall last Wednesday  She had fallen in the past and sustained rib fractures but the daughter states she doesn't complain about that anymore  She had an episode at [facility] where she had garbled speech and had an MRI which was negative for stroke  Daughter is caring for the patient at home  She currently has a part time aid at home  After her last hospitalization at [address] [place] [address] she was referred to outpatient palliative care but daughter states no one ever discussed what this entailed with her  That appointment is the end of February

## 2020-01-29 NOTE — ASSESSMENT & PLAN NOTE
Admit to med/surg  Patient with gradual decline over weeks to months  Reviewed medical records from recent admissions to [facility] and had long discussion with daughter at bedside  Ultimate goal is for patient to continue living at home with daughter   Patient had complete workup at [facility] recently including MRI brain which was negative for acute infarct but did show small vessel disease  Also had ECHO which showed EF of 20-25%  Patient has been generally weak  Unable to participate with home physical therapy recently     Does appear dehydrated  Will also check for UTI, although she does not have a fever or elevated WBC  She is DNR/DNI  Will consult palliative care

## 2020-01-29 NOTE — OCCUPATIONAL THERAPY NOTE
OccupationalTherapy Evaluation (eval time= 2218-4037, tx time= 8001-7221)     Patient Name: Yesenia Duran  OPVPF'G Date: 1/29/2020  Problem List  Principal Problem:    Altered mental status  Active Problems:    CHF (congestive heart failure) (HCC)    CKD (chronic kidney disease)    HTN (hypertension)    Dementia ([facility] [address] )    Diabetes ([facility] [address] )    Past Medical History  Past Medical History:   Diagnosis Date    Pacemaker      Past Surgical History  History reviewed  No pertinent surgical history  01/29/20 1030   Note Type   Note type Eval/Treat   Restrictions/Precautions   Weight Bearing Precautions Per Order No   Other Precautions Cognitive; Bed Alarm;Aspiration;Multiple lines;Telemetry; Fall Risk;Hard of hearing; Chair Alarm   Pain Assessment   Pain Assessment FLACC   Pain Score No Pain   Pain Rating: FLACC (Rest) - Face 0   Pain Rating: FLACC (Rest) - Legs 0   Pain Rating: FLACC (Rest) - Activity 0   Pain Rating: FLACC (Rest) - Cry 0   Pain Rating: FLACC (Rest) - Consolability 0   Score: FLACC (Rest) 0   Home Living   Type of Home House   Home Layout Two level; Able to live on main level with bedroom/bathroom  (0 BRAD)   Home Equipment Walker   Prior Function   Lives With Daughter;Family  (Son in law)   ADL Assistance Needs assistance   IADLs Needs assistance   Falls in the last 6 months 1 to 4  (2)   Vocational Retired   Lifestyle   Autonomy PTA family (i e  daughter) states pt had assistance with her ADLs; family states pt was I with her transfers and ambulation- with RW; daughter states recent decline (since last wednesday) in function after her fall; HHA 2x/wk; daughter plans to provide 24 hour care upon discharge    Reciprocal Relationships suportive daughter   Service to Others working as a    Intrinsic Gratification watching TV   Psychosocial   Psychosocial (WDL) X   Patient Behaviors/Mood Cooperative;Flat affect   Subjective   Subjective "I'm as old as I should be"   ADL   Where Assessed Edge of bed   Eating Assistance 5  Supervision/Setup   Grooming Assistance 4  Minimal Assistance   UB Bathing Assistance 2  Maximal Assistance   LB Bathing Assistance 2  Maximal Assistance   UB Dressing Assistance 2  Maximal Assistance   LB Dressing Assistance 2  Maximal Assistance   Bed Mobility   Supine to Sit 2  Maximal assistance   Additional items Assist x 2; Increased time required;Verbal cues;LE management   Sit to Supine 2  Maximal assistance   Additional items Assist x 2; Increased time required;Verbal cues;LE management   Transfers   Sit to Stand 2  Maximal assistance   Additional items Assist x 2; Increased time required;Verbal cues   Stand to Sit 2  Maximal assistance   Additional items Assist x 2; Increased time required;Verbal cues   Functional Mobility   Functional Mobility 2  Maximal assistance  (Recommend Jose Armando lift with OOB with nsg)   Additional Comments x2   Additional items Rolling walker   Balance   Static Sitting Fair -   Dynamic Sitting Poor +   Static Standing Poor -   Dynamic Standing Poor -   Ambulatory Zero   Activity Tolerance   Activity Tolerance Patient limited by fatigue   Medical Staff Made Aware Nsg, PT   RUE Assessment   RUE Assessment WFL   RUE Strength   RUE Overall Strength   (3+/5 throughout)   LUE Assessment   LUE Assessment   (limited shr AROM (i e  90* flex), elbow-distal=WFL)   LUE Strength   LUE Overall Strength   (3/5 throughout)   Hand Function   Gross Motor Coordination Functional   Fine Motor Coordination Impaired  (limited strength noted B/L)   Sensation   Light Touch No apparent deficits   Proprioception   Proprioception No apparent deficits   Vision-Basic Assessment   Current Vision   (no glasses noted)   Vision - Complex Assessment   Visual Fields   (able to scan visual field)   Acuity   (impaired)   Perception   Inattention/Neglect Cues to attend to left side of body;Cues to attend left visual field   Cognition   Overall Cognitive Status Impaired Arousal/Participation Arousable; Cooperative;Lethargic   Attention Difficulty attending to directions   Orientation Level Oriented to person   Memory Decreased recall of biographical information;Decreased long term memory;Decreased short term memory;Decreased recall of recent events;Decreased recall of precautions   Following Commands Follows one step commands with increased time or repetition   Comments family reports history of cognitive decline   Assessment   Limitation Decreased ADL status; Decreased UE strength;Decreased Safe judgement during ADL;Decreased cognition;Decreased UE ROM; Decreased endurance;Visual deficit; Decreased fine motor control;Decreased high-level ADLs; Decreased self-care trans   Prognosis Fair   Assessment Pt is an 81 y/o F admitted to the hospital 2* symptoms of right sided lean, altered mental status, and inability to get out of bed  Pt noted with elevated troponins  Pt with a PMH of cognitive decline, falls (with rib fractures), and multiple hospitalizations 2* failure to thrive  PTA family (i e  daughter) states pt had assistance with her ADLs; family states pt was I with her transfers and ambulation- with RW; daughter states recent decline (since last wednesday) in function after her fall; HHA 2x/wk; daughter plans to provide 24 hour care upon discharge  During initial eval, pt demonstrated deficits with her funcitonal balance, funcitonal mobility, ADL status, transfer safety, B/L UE strength, functional activity tolerance (current = poor 5-10 minutes), and cognition (i e  orientation, problem solving, judgment and safety, and direction following)  Pt would benefit frrom continued OT tx for the above stated deficits 3-5 x/wk for 1-2 wks  Goals   Patient Goals none noted 2* cognition   STG Time Frame   (1-7)   Short Term Goal #1 Pt will demonstrate improved activity tolerance to fair(15-20mins) and standing tolerance to 1-3mins to assist with ADLs     Short Term Goal #2 Pt will demonstrate modA with their bed mobility and sit-stand transfers to assist with ADLs   Short Term Goal  Pt will tolerate continued cognitive/home-safety assessment and appropriate d/c recommendations will be provided  LTG Time Frame   (7-14)   Long Term Goal #1 Pt will improve functional balance by 1 grade to assist with ADLs  Long Term Goal #2 Pt will demonstrate min A with their UE and Mod A LE bathing/dresssing  Long Term Goal Pt will demonstrate proper walker/transfer safety 100% of the time  Plan   Treatment Interventions ADL retraining;Functional transfer training;UE strengthening/ROM; Endurance training;Cognitive reorientation;Patient/family training;Fine motor coordination activities; Compensatory technique education;Equipment evaluation/education; Activityengagement   Goal Expiration Date 02/12/20   OT Treatment Day 0   OT Frequency 3-5x/wk   Additional Treatment Session   Start Time 1000   End Time 1030   Treatment Assessment Pt seen for 30 minute tx session with focus on ADLs, functional mobility, and cognition  Pt incontinent with large formed bowel  Pt dependent with toileting task, and LE bathing  Pt required heavy assistance with bed mobility  Cognitive deficits noted (i e  direction following, attention)  Would benefit from renée transfers for OOB with nursing  Pt continues to demonstrate appropriateness for inpatient rehab to improve her overall levle of independence, but family prefers pt return directly home with increased services   Will continue   Additional Treatment Day 1   Recommendation   OT Discharge Recommendation Short Term Rehab  (Daughter prefers home with increased home services)   Barthel Index   Feeding 5   Bathing 0   Grooming Score 0   Dressing Score 0   Bladder Score 0   Bowels Score 0   Toilet Use Score 0   Transfers (Bed/Chair) Score 5   Mobility (Level Surface) Score 0   Stairs Score 0   Barthel Index Score 10   Catalino Reese, OT

## 2020-01-29 NOTE — PLAN OF CARE
Problem: Potential for Falls  Goal: Patient will remain free of falls  Description  INTERVENTIONS:  - Assess patient frequently for physical needs  -  Identify cognitive and physical deficits and behaviors that affect risk of falls    -  [place] fall precautions as indicated by assessment   - Educate patient/family on patient safety including physical limitations  - Instruct patient to call for assistance with activity based on assessment  - Modify environment to reduce risk of injury  - Consider OT/PT consult to assist with strengthening/mobility  Outcome: Progressing     Problem: Prexisting or High Potential for Compromised Skin Integrity  Goal: Skin integrity is maintained or improved  Description  INTERVENTIONS:  - Identify patients at risk for skin breakdown  - Assess and monitor skin integrity  - Assess and monitor nutrition and hydration status  - Monitor labs   - Assess for incontinence   - Turn and reposition patient  - Assist with mobility/ambulation  - Relieve pressure over bony prominences  - Avoid friction and shearing  - Provide appropriate hygiene as needed including keeping skin clean and dry  - Evaluate need for skin moisturizer/barrier cream  - Collaborate with interdisciplinary team   - Patient/family teaching  - Consider wound care consult   Outcome: Progressing     Problem: PAIN - ADULT  Goal: Verbalizes/displays adequate comfort level or baseline comfort level  Description  Interventions:  - Encourage patient to monitor pain and request assistance  - Assess pain using appropriate pain scale  - Administer analgesics based on type and severity of pain and evaluate response  - Implement non-pharmacological measures as appropriate and evaluate response  - Consider cultural and social influences on pain and pain management  - Notify physician/advanced practitioner if interventions unsuccessful or patient reports new pain  Outcome: Progressing     Problem: DISCHARGE PLANNING  Goal: Discharge to home or other facility with appropriate resources  Description  INTERVENTIONS:  - Identify barriers to discharge w/patient and caregiver  - Arrange for needed discharge resources and transportation as appropriate  - Identify discharge learning needs (meds, wound care, etc )  - Arrange for interpretive services to assist at discharge as needed  - Refer to Case Management Department for coordinating discharge planning if the patient needs post-hospital services based on physician/advanced practitioner order or complex needs related to functional status, cognitive ability, or social support system  Outcome: Progressing     Problem: Knowledge Deficit  Goal: Patient/family/caregiver demonstrates understanding of disease process, treatment plan, medications, and discharge instructions  Description  Complete learning assessment and assess knowledge base    Interventions:  - Provide teaching at level of understanding  - Provide teaching via preferred learning methods  Outcome: Progressing     Problem: CARDIOVASCULAR - ADULT  Goal: Maintains optimal cardiac output and hemodynamic stability  Description  INTERVENTIONS:  - Monitor I/O, vital signs and rhythm  - Monitor for S/S and trends of decreased cardiac output  - Administer and titrate ordered vasoactive medications to optimize hemodynamic stability  - Assess quality of pulses, skin color and temperature  - Assess for signs of decreased coronary artery perfusion  - Instruct patient to report change in severity of symptoms  Outcome: Progressing  Goal: Absence of cardiac dysrhythmias or at baseline rhythm  Description  INTERVENTIONS:  - Continuous cardiac monitoring, vital signs, obtain 12 lead EKG if ordered  - Administer antiarrhythmic and heart rate control medications as ordered  - Monitor electrolytes and administer replacement therapy as ordered  Outcome: Progressing     Problem: RESPIRATORY - ADULT  Goal: Achieves optimal ventilation and oxygenation  Description  INTERVENTIONS:  - Assess for changes in respiratory status  - Assess for changes in mentation and behavior  - Position to facilitate oxygenation and minimize respiratory effort  - Oxygen administered by appropriate delivery if ordered  - Initiate smoking cessation education as indicated  - Encourage broncho-pulmonary hygiene including cough, deep breathe, Incentive Spirometry  - Assess the need for suctioning and aspirate as needed  - Assess and instruct to report SOB or any respiratory difficulty  - Respiratory Therapy support as indicated  Outcome: Progressing     Problem: GASTROINTESTINAL - ADULT  Goal: Minimal or absence of nausea and/or vomiting  Description  INTERVENTIONS:  - Administer IV fluids if ordered to ensure adequate hydration  - Maintain NPO status until nausea and vomiting are resolved  - Nasogastric tube if ordered  - Administer ordered antiemetic medications as needed  - Provide nonpharmacologic comfort measures as appropriate  - Advance diet as tolerated, if ordered  - Consider nutrition services referral to assist patient with adequate nutrition and appropriate food choices  Outcome: Progressing  Goal: Maintains adequate nutritional intake  Description  INTERVENTIONS:  - Monitor percentage of each meal consumed  - Identify factors contributing to decreased intake, treat as appropriate  - Assist with meals as needed  - Monitor I&O, weight, and lab values if indicated  - Obtain nutrition services referral as needed  Outcome: Progressing     Problem: GENITOURINARY - ADULT  Goal: Maintains or returns to baseline urinary function  Description  INTERVENTIONS:  - Assess urinary function  - Encourage oral fluids to ensure adequate hydration if ordered  - Administer IV fluids as ordered to ensure adequate hydration  - Administer ordered medications as needed  - Offer frequent toileting  - Follow urinary retention protocol if ordered  Outcome: Progressing  Goal: Urinary catheter remains patent  Description  INTERVENTIONS:  - Assess patency of urinary catheter  - If patient has a chronic roberts, consider changing catheter if non-functioning  - Follow guidelines for intermittent irrigation of non-functioning urinary catheter  Outcome: Progressing     Problem: MUSCULOSKELETAL - ADULT  Goal: Maintain or return mobility to safest level of function  Description  INTERVENTIONS:  - Assess patient's ability to carry out ADLs; assess patient's baseline for ADL function and identify physical deficits which impact ability to perform ADLs (bathing, care of mouth/teeth, toileting, grooming, dressing, etc )  - Assess/evaluate cause of self-care deficits   - Assess range of motion  - Assess patient's mobility  - Assess patient's need for assistive devices and provide as appropriate  - Encourage maximum independence but intervene and supervise when necessary  - Involve family in performance of ADLs  - Assess for home care needs following discharge   - Consider OT consult to assist with ADL evaluation and planning for discharge  - Provide patient education as appropriate  Outcome: Progressing     Problem: Nutrition/Hydration-ADULT  Goal: Nutrient/Hydration intake appropriate for improving, restoring or maintaining nutritional needs  Description  Monitor and assess patient's nutrition/hydration status for malnutrition  Collaborate with interdisciplinary team and initiate plan and interventions as ordered  Monitor patient's weight and dietary intake as ordered or per policy  Utilize nutrition screening tool and intervene as necessary  Determine patient's food preferences and provide high-protein, high-caloric foods as appropriate       INTERVENTIONS:  - Monitor oral intake, urinary output, labs, and treatment plans  - Assess nutrition and hydration status and recommend course of action  - Evaluate amount of meals eaten  - Assist patient with eating if necessary   - Allow adequate time for meals  - Recommend/ encourage appropriate diets, oral nutritional supplements, and vitamin/mineral supplements  - Order, calculate, and assess calorie counts as needed  - Recommend, monitor, and adjust tube feedings and TPN/PPN based on assessed needs  - Assess need for intravenous fluids  - Provide specific nutrition/hydration education as appropriate  - Include patient/family/caregiver in decisions related to nutrition  Outcome: Progressing

## 2020-01-29 NOTE — PHYSICAL THERAPY NOTE
PT EVALUATION    80 y o     [id number]    Dehydration [E86 0]  Altered mental status [R41 82]  Failure to thrive in adult [R62 7]  Ambulatory dysfunction [R26 2]    Past Medical History:   Diagnosis Date    Pacemaker          History reviewed  No pertinent surgical history  01/29/20 1142   Note Type   Note type Eval only   Pain Assessment   Pain Assessment No/denies pain   Home Living   Type of Home House   Home Layout Multi-level; Able to live on main level with bedroom/bathroom  (4 steps between levels )   Home Equipment Walker   Additional Comments resides with dtr and son in law  Has in home aides 2x/wk  Prior Function   Level of [place] Needs assistance with ADLs and functional mobility  (I with use of RW up until Wednesday prior to admission )   Lives With [facility] Help From Family;Personal care attendant   ADL Assistance Needs assistance   IADLs Needs assistance   Falls in the last 6 months 1 to 4  (2)   Vocational Retired   Comments Ambulated approx 100ft per day with RW unassisted until Wednesday prior to admission in which unable to ambulate and functional decline observed  Pt with dementia, dtr providing most of history   Restrictions/Precautions   Weight Bearing Precautions Per Order No   Other Precautions Cognitive; Chair Alarm; Bed Alarm;Multiple lines; Fall Risk;Telemetry;Aspiration;Hard of hearing  ([place])   General   Additional Pertinent History Pt is 79 y/o female admitted with failure to thrive  Change in mental status  Weakness, decreased po intake  PT consulted  Family/Caregiver Present No   Cognition   Overall Cognitive Status Impaired   Arousal/Participation Cooperative   Orientation Level Oriented to person   Following Commands Follows one step commands inconsistently   Comments confused    Requires verbal, tactile cues as well as demonstration with variable motor carryover   RUE Assessment   RUE Assessment   (see OT notes, limited assessment 2* cognition )   LUE Assessment   LUE Assessment   (see OT notes  Limited assessment 2* cognition )   RLE Assessment   RLE Assessment X  (grossly <3-/5, limited assessment 2* cognition )   LLE Assessment   LLE Assessment X  (grossly <3-/5  Limited assessment 2* cognition )   Bed Mobility   Supine to Sit 2  Maximal assistance   Additional items Assist x 2; Increased time required;Verbal cues;LE management   Sit to Supine 2  Maximal assistance   Additional items Assist x 2; Increased time required;Verbal cues;LE management   Additional Comments increased time to complete transitions needed with use of bedpad to facilitate transition  Transfers   Sit to Stand 2  Maximal assistance   Additional items Assist x 2; Increased time required;Verbal cues   Stand to Sit 2  Maximal assistance   Additional items Assist x 2; Increased time required;Verbal cues   Additional Comments heaviliy retropulsive, + LE tremulousness  Needing to sit as beginning to have BM  Standing tolerance limited to less than 5 seconds  Ambulation/Elevation   Gait pattern Not appropriate  (given weakness, poor standing tolerance, balance )   Balance   Static Sitting Fair -   Dynamic Sitting Poor +   Static Standing Poor -   Dynamic Standing   (0)   Ambulatory Zero   Endurance Deficit   Endurance Deficit Yes   Endurance Deficit Description weakness, fatigue, cognition   Activity Tolerance   Activity Tolerance Patient limited by fatigue; Other (Comment)  (cognition )   Medical Staff Made Aware Nurse, Ann-Marie Clark   Nurse Made Aware yes   Assessment   Prognosis Guarded   Problem List Decreased strength;Decreased range of motion;Decreased endurance; Impaired balance;Decreased mobility; Decreased cognition;Decreased coordination; Impaired judgement;Decreased safety awareness; Impaired hearing;Pain;Decreased skin integrity   Assessment Pt is 81 y/o female admitted with change in mental status, weakness, decreased appetite  Failure to thrive  PT consulted   Up with assist orders  Prior to admission resides with daughter and son in law in split level home with 4 steps between each floor  Up until Last Wednesday leading to admission significant decline in mobility to being unable to ambulation  Prior was ambulating in home with RW support unassisted  Able to negotiate stairs  A for ADLS  Hx of 2 falls  Ambulated approx 100 ft/day  Now with significant functional limitations observed given impaired cognition, decreased activity tolerance, balance, strength and overall mobility  Max A of 2 for bed mobility and transfers sit to stand  Standing tolerance with RW limited to less than 5 seconds  Heavily retropulsive in standing with LE tremulousness observed  Appears fearful with attempt at mobility  Unable to progress with ambulation at this time given weakness  Would recommend continued PT in order to optimize functional outcomes while lessening caregiver assistance  Rehab would be beneficial given significance of impairments, however family preference to care for pt in home  Will follow and progress towards goals  Barriers to Discharge Inaccessible home environment   Barriers to Discharge Comments can have first floor set up  Goals   Patient Goals none stated 2* cognition   STG Expiration Date 02/08/20   Short Term Goal #1 10 days: 1)  Pt will perform bed mobility with modA  in order to improve function and lessen caregiver assistance  2)  Perform all transfers with modA  in order to improve ability to negotiate safely in home environment with less reliance on caregiver  3) Amb with least restrictive AD > 40'x1 with mod A in order to demonstrate ability to negotiate in home environment with less assistance from caregiver  4)  Improve overall strength and balance 1/2 grade in order to optimize ability to perform functional tasks and reduce fall risk  5) Increase activity tolerance to 30 minutes in order to improve endurance to functional tasks  6)  Negotiate stairs using most appropriate technique and modA in order to be able to negotiate safely in home environment  7) PT for ongoing patient and family/caregiver education, DME needs and d/c planning in order to promote highest level of function in least restrictive environment  Plan   Treatment/Interventions Functional transfer training;LE strengthening/ROM; Elevations; Therapeutic exercise; Endurance training;Patient/family training;Equipment eval/education; Bed mobility;Gait training; Compensatory technique education;Continued evaluation;Spoke to nursing;OT   PT Frequency Other (Comment)  (3-5x/wk)   Recommendation   Recommendation Short-term skilled PT  (rec continued rehab  Family preference home )   Equipment Recommended   May benefit from hospital bed to assist with care     PT - OK to Discharge No  (to home, yes if to [facility] )   Barthel Index   Feeding 5   Bathing 0   Grooming Score 0   Dressing Score 0   Bladder Score 0   Bowels Score 5   Toilet Use Score 5   Transfers (Bed/Chair) Score 5   Mobility (Level Surface) Score 0   Stairs Score 0   Barthel Index Score 20     History: co - morbidities, fall risk, use of assistive device, assist for adl's, cognition, multiple lines  Exam: impairments in locomotion, musculoskeletal, balance,posture, joint integrity, skin integrity,  cognition   Clinical: unstable/unpredictable ( fall risk, Ax2, cognition, bed/chair alarm, trending labs)  Complexity:high  Rudolph Martin, PT

## 2020-01-29 NOTE — UTILIZATION REVIEW
Initial Clinical Review    Admission: Date/Time/Statement: Inpatient Admission Orders (From admission, onward)     Ordered        01/28/20 1955  Inpatient Admission  Once                   Orders Placed This Encounter   Procedures    Inpatient Admission     Standing Status:   Standing     Number of Occurrences:   1     Order Specific Question:   Admitting Physician     Answer:   Cande Yoo [[id number]]     Order Specific Question:   Level of Care     Answer:   Med Surg [16]     Order Specific Question:   Estimated length of stay     Answer:   More than 2 Midnights     Order Specific Question:   Certification     Answer:   I certify that inpatient services are medically necessary for this patient for a duration of greater than two midnights  See H&P and MD Progress Notes for additional information about the patient's course of treatment  ED Arrival Information     Expected Arrival Acuity Means of Arrival Escorted By Service Admission Type    - 1/28/2020 17:24 Urgent Ambulance [facility] Urgent    Arrival Complaint    altered mental status        Chief Complaint   Patient presents with    Altered Mental Status     Per EMS,patient was sent from home  Family reports that the patient has not eaten today and appears to be more confused than her baseline, hx  of dementia  Family reports fall one week ago  Assessment/Plan:  79 yo female presents to d from home with  Confusion, weakness, loss of appetite, fall a few days back (known R rib fxs  from prior fall)  Family concerned for dehydration  Was unable to get OOB   Seemed  too confused or unable to focus enough to eat & was just letting the food sit in her mouth  On exam: frail, mild confusion - oriented to person, mild R foot swelling, Bruise on buttock from prehospital fall  Per HX -Recent ECHO  EF 20-25% - does not appear fluid overloaded   Plan: Hold lasix for now in setting of dehydration, Consult PT / OT / Speech, Check for UTI, Consult Palliative Care  1/29: Remains confused to time and situation  Lethargic, drowsy  Per PT - recommend STR  Dtr now requesting Hospice Eval per Palliative  Care  Referral made         ED Triage Vitals [01/28/20 1736]   Temperature Pulse Respirations Blood Pressure SpO2   97 6 °F (36 4 °C) 67 16 127/71 (!) 88 %      Temp Source Heart Rate Source Patient Position - Orthostatic VS BP Location FiO2 (%)   Oral Monitor Lying Right arm --      Pain Score       No Pain        Wt Readings from Last 1 Encounters:   12/11/14 71 2 kg (157 lb)     Additional Vital Signs:   01/29/20 14:14:47   63 16 156/65 96 %     01/29/20 12:15:46   60 18 161/63 98 %     01/29/20 07:04:28  98 °F (36 7 °C) 70 16 169/74 99 %     01/28/20 2300   66  141/61   Lying   01/28/20 21:14:04  98 3 °F (36 8 °C) 59 16 175/7 100 %     01/28/20 2037  97 8 °F (36 6 °C) 68 16 183/74 100 %  Lying   01/28/20 1745      97 % Nasal cannula 2L         Pertinent Labs/Diagnostic Test Results:   Results from last 7 days   Lab Units 01/29/20  0342 01/28/20  1836   WBC Thousand/uL 6 22 7 45   HEMOGLOBIN g/dL 10 7* 11 8   HEMATOCRIT % 32 8* 35 7   PLATELETS Thousands/uL 118* 139*   NEUTROS ABS Thousands/µL  --  6 25     Results from last 7 days   Lab Units 01/29/20  0342 01/28/20  1836   SODIUM mmol/L 147* 140   POTASSIUM mmol/L 3 5 4 0   CHLORIDE mmol/L 106 103   CO2 mmol/L 33* 32   ANION GAP mmol/L 8 5   BUN mg/dL 26* 30*   CREATININE mg/dL 1 00 1 07   EGFR ml/min/1 73sq m 51 47   CALCIUM mg/dL 9 5 9 8     Results from last 7 days   Lab Units 01/28/20  1836   AST U/L 16   ALT U/L 9*   ALK PHOS U/L 71   TOTAL PROTEIN g/dL 7 4   ALBUMIN g/dL 3 4*   TOTAL BILIRUBIN mg/dL 0 97     Results from last 7 days   Lab Units 01/29/20  1112 01/29/20  1056 01/29/20  0708 01/28/20  2236 01/28/20  1823   POC GLUCOSE mg/dl 205* 430* 136 116 198*     Results from last 7 days   Lab Units 01/29/20  0342 01/28/20  1836   GLUCOSE RANDOM mg/dL 133 216*     Results from last 7 days   Lab Units 01/29/20  0345   HEMOGLOBIN A1C % 5 7   EAG mg/dl 117     Results from last 7 days   Lab Units 01/29/20  0335 01/28/20  2253 01/28/20  1836   TROPONIN I ng/mL 0 09* 0 10* 0 08*     Results from last 7 days   Lab Units 01/28/20  1836   PROTIME seconds 13 5   INR  1 02   PTT seconds 22*     Results from last 7 days   Lab Units 01/29/20  0342   TSH 3RD GENERATON uIU/mL 0 412     1/28 CT Head: No acute intracranial abnormality   Microangiopathic changes  1/28 Right Ankle Xray: No acute osseous abnormality  1/28 CXR :No acute cardiopulmonary disease  1/28 EKG:Atrial sensed ventricular paced rhythm   suspected biventricular pacing      ED Treatment:   Medication Administration from 01/28/2020 1724 to 01/28/2020 2053       Date/Time Order Dose Route Action     01/28/2020 1920 sodium chloride 0 9 % bolus 500 mL 500 mL Intravenous New Bag        Past Medical History:   Diagnosis Date    Pacemaker        Admitting Diagnosis: Dehydration [E86 0]  Altered mental status [R41 82]  Failure to thrive in adult [R62 7]  Ambulatory dysfunction [R26 2]     Age/Sex: 80 y o  female  Admission Orders:  Scheduled Medications:  Medications:  aspirin 81 mg Oral Daily   atorvastatin 20 mg Oral Daily With Dinner   calcitriol 0 25 mcg Oral Once per day on Mon Wed Fri   carvedilol 3 125 mg Oral BID With Meals   dextran 70-hypromellose 1 drop Both Eyes Q12H EMILIE   DULoxetine 30 mg Oral Daily   enoxaparin 40 mg Subcutaneous Daily   ferrous sulfate 325 mg Oral HS   insulin lispro 1-5 Units Subcutaneous TID AC   insulin lispro 1-5 Units Subcutaneous HS   levothyroxine 50 mcg Oral Early Morning   magnesium oxide 400 mg Oral After Breakfast   pantoprazole 20 mg Oral Early Morning     Continuous IV Infusions:  PRN Meds:  acetaminophen 650 mg Oral Q6H PRN   ondansetron 4 mg Intravenous Q6H PRN     TELE  IP CONSULT TO PALLIATIVE CARE  IP CONSULT TO CASE MANAGEMENT  IP CONSULT TO HOSPICE  SCD's    Network Utilization Review Department  [email or web address]  ATTENTION: Please call with any questions or concerns to [phone] and carefully listen to the prompts so that you are directed to the right person  All voicemails are confidential   Bessie Gates all requests for admission clinical reviews, approved or denied determinations and any other requests to dedicated fax number below belonging to the campus where the patient is receiving treatment   List of dedicated fax numbers for the Facilities:  [address] DENIALS (Administrative/Medical Necessity) [phone]   [address] (Maternity/NICU/Pediatrics) [phone]   Lorena Hardy [phone]   [facility] [phone]   [address] [place] [address] [phone]   [address] [place] [address]  [phone]   [address] [place] [address] [phone]   [facility]  [phone]   [address] [facility]  [address] [facility] [place] [address] [facility] [phone]

## 2020-01-29 NOTE — CONSULTS
Consultation - Palliative and Supportive Care   Kristal Acosta 80 y o  female [id number]      IDENTIFICATION:  Inpatient consult to Palliative Care  Consult performed by: Celi Hernandez MD  Consult ordered by: Alonso Ovalle PA-C        Physician Requesting Consult: Mis Kiser DO  Reason for Consult / Principal Problem: assistance with goals setting, advanced care planning  Hx and PE limited by: dementia    HISTORY OF PRESENT ILLNESS:       Kristal Acosta is a 80 y o  female with dementia, HTN, DM, HLD, DM2, chronic HFrEF, LBBB, CKD3, ambulatory dysfunction, falls, who is admitted from home with generalized weakness, poor oral intake, confusion  PCS for goals  Met with patient today  She appears very frail and very weak  She is largely confused  She does not seem to be in any obvious cardiorespiratory distress  Met with daughter and primary caretaker, Driss Dickerson, today and held a family meeting  Details as below  PALLIATIVE AND SUPPORTIVE CARE FAMILY CONFERENCE:    Time of Meetin:30    Participants: daughter/Moon, and myself    Patient Participation: none    Patient Support System: Driss Dickerson and her family    Meeting Location: Conference room    Advanced Directive or POLST available: Living will reviewed  Copy given to CM  Driss Dickerson is the 1st agent  A family meeting was held for goals of care  This meeting was necessary for determine the appropriate course of treatment  Topics of Discussion:  1  Driss Dickerson noted the beginning of both functional and cognitive decline 5-6 years ago when she first manifested with shuffling gait  This was also when her  passed away from complications of dementia  She then noted that she was becoming more and more forgetful  She started to have frequent hospitalizations, after which, Driss Dickerson decided that she should no longer live by herself at home any longer  She was moved to [facility] where she stayed for about a year   4 years ago, she started to have frequent falls that sometimes resulted in broken bones - wrists, ribs  In 2018, while in [place] to attend Moon's daughter's wedding, Mitch Garland had a fall that resulted in a few broken ribs  Then she had another fall in October 2019 that resulted in broken ribs in the opposite side  She was discharged to a STR for 20 days then transferred to another [facility] in [facility] to continue rehab  She finally made it home in December 2019  However, Shantel Vega noted that since coming home, that she appeared much weaker and less mobile  She noted periods of more confusion  She had 3 ED visits since 12/2019 for confusion, falls, and vomiting, respectively  2 days prior to this admission, Shantel Vega noted that her oral intake has been poor  Mitch Garland was observed to be pocketing food and water  She needed frequent coaching to swallow, walk, etc  She was extremely weak  And then on the day of admission, Shantel Silvia can't pick her up from bed to transfer her to a chair  She then decided to bring him to the hospital     2  It does not appear that she had been formally evaluated for dementia  But Shantel Vega noted that she observed development of shuffling gait first prior to cognitive decline  On further prompting, she also noticed that she somehow freezes mid step before taking another step  There are also high-frequency tremors in her hands when engaged with purposeful activities  She has frequent falls that are largely unexplainable but seem related to unsteadiness  She also noted that her face seem frozen more so lately and she is low to respond  [address] has been incontinent of urine, was able to eat a complete meal, was able to stand up and walk with assistance  However, she is now unable to bear weight or get out of bed, pockets food and now with some evidence of dysphagia  She lost about 30lbs in the last 6 months       4  We discussed the possibility of Kathryn having Parkinson's dementia and how we may be seeing the start of a relatively rapid and progressive decline in both function and cognition  5  We spoke about the trajectory of dementia in general and how this may be viewed as a terminal illness  6  We spoke about how proceeding with physical therapy at a [facility] in no long feasible given her advanced cognitive impairment and overall debility making it unable for her to actively engage physically with exercises  7  We spoke about the role of artificial nutrition and hydration (ie NGT, TF, TPN, PEG) and how evidence show NO increase in survivability and/or NO improvement in quality of life in patients suffering from dementia  8  We spoke about her other medical illnesses - HFrEF and CKD - both of which will progressively get worse by nature  9  We spoke about overall goals for her mother, with emphasis on her quality of life  Fay Winn is now recognizing that she would not want to subject her mother to repeated hospitalizations and would rather keep her comfortable at home  10  We spoke about hospice - Medicare, philosophy, hospice team, hospice schedules and visits, comfort meds, different level of hospice cares  We spoke about set guidelines that would qualify a patient for hospice  She is aware that a hospice evaluation will take place to determine eligibility for hospice at this point in time  If she is found ineligible, the hospice team can do a 2 week follow up for re-evaluation  11  We spoke about the difference between hospice and palliative care approach  Other Content of Meeting:  Time spent providing psychosocial support [facility] who is struggling with the difficult decision  Assurance and reassurance were provided about her goals of care for her mother and about ensuring her comfort at home      Assessment:  Dementia, likely Parkinson's but mixed type cannot be fully excluded (vascular+Parkinsons+Alzheimers)  Debility  Frailty  Poor oral intake  Dysphagia  Frequent falls  Failure to thrive  Goals of care  Lack of capacity  DM  HTN  HLD  CKD3    PLAN:  1  Meka Wade would like to consult with hospice to determine eligibility for hospice cares and if so, would like to transition to comfort directed measures only  2  If she is eligible for hospice, she would like to receive cares at home  She needs DMEs at home  3  She would like to forego any more blood draws, imaging  4  Will fill out a POLST form with Luisa tomorrow      Time Involved in Meetinmins, beginning at approximately 12:30 and ending at approximately 2pm        Review of Systems   Unable to perform ROS: dementia (appears largely comfortable)       Past Medical History:   Diagnosis Date    Pacemaker      History reviewed  No pertinent surgical history  Social History     Socioeconomic History    Marital status:       Spouse name: Not on file    Number of children: Not on file    Years of education: Not on file    Highest education level: Not on file   Occupational History    Not on file   Social Needs    Financial resource strain: Not on file    Food insecurity:     Worry: Not on file     Inability: Not on file    Transportation needs:     Medical: Not on file     Non-medical: Not on file   Tobacco Use    Smoking status: Never Smoker    Smokeless tobacco: Never Used   Substance and Sexual Activity    Alcohol use: Never     Frequency: Never    Drug use: Never    Sexual activity: Not on file   Lifestyle    Physical activity:     Days per week: Not on file     Minutes per session: Not on file    Stress: Not on file   Relationships    Social connections:     Talks on phone: Not on file     Gets together: Not on file     Attends Synagogue service: Not on file     Active member of club or organization: Not on file     Attends meetings of clubs or organizations: Not on file     Relationship status: Not on file    Intimate partner violence:     Fear of current or ex partner: Not on file     Emotionally abused: Not on file     Physically abused: Not on file     Forced sexual activity: Not on file   Other Topics Concern    Not on file   Social History Narrative    Not on file     History reviewed  No pertinent family history  MEDICATIONS / ALLERGIES:    all current active meds have been reviewed    No Known Allergies    OBJECTIVE:    Physical Exam  Physical Exam   Constitutional:  Non-toxic appearance  No distress  Appears as stated age, appears chronically ill, not in any obvious cardiorespiratory distress   HENT:   Head: Normocephalic and atraumatic  Eyes: Pupils are equal, round, and reactive to light  EOM are normal  No scleral icterus  Pulmonary/Chest: Effort normal  No respiratory distress  Abdominal: She exhibits no distension  Musculoskeletal: She exhibits no edema  Neurological:   Awake, slow to respond, largely confused, flat affect   Skin: There is pallor  Psychiatric: She is not agitated  Lab Results: I have personally reviewed pertinent labs  as noted in the [facility]  Imaging Studies: I have personally reviewed pertinent reports  as noted in the [facility]  EKG, Pathology, and Other Studies: I have personally reviewed pertinent reports  as noted in the [facility]      We appreciate the invitation to be involved in this patient's care  We will continue to follow peripherally  Please do not hesitate to reach our on call provider through our clinic answering service at  should you have acute symptom control concerns  Counseling / Coordination of Care  Total floor / unit time spent today 75 minutes  Greater than 50% of total time was spent with the patient and / or family counseling and / or coordination of care   A description of the counseling / coordination of care: provided medical updates, discussed palliative care, discussed hospice care, determined capacity/competency, determined goals of care, determined POA, determined social/family support, discussed plans of care, discussed symptom management, provided psychosocial support        Rogelio Castillo MD  Palliative Medicine & Supportive Care  Internal Medicine  Available via [facility] Text  Office: [phone]  Fax: [phone]

## 2020-01-29 NOTE — PLAN OF CARE
Problem: Potential for Falls  Goal: Patient will remain free of falls  Description  INTERVENTIONS:  - Assess patient frequently for physical needs  -  Identify cognitive and physical deficits and behaviors that affect risk of falls    -  [place] fall precautions as indicated by assessment   - Educate patient/family on patient safety including physical limitations  - Instruct patient to call for assistance with activity based on assessment  - Modify environment to reduce risk of injury  - Consider OT/PT consult to assist with strengthening/mobility  Outcome: Progressing     Problem: Prexisting or High Potential for Compromised Skin Integrity  Goal: Skin integrity is maintained or improved  Description  INTERVENTIONS:  - Identify patients at risk for skin breakdown  - Assess and monitor skin integrity  - Assess and monitor nutrition and hydration status  - Monitor labs   - Assess for incontinence   - Turn and reposition patient  - Assist with mobility/ambulation  - Relieve pressure over bony prominences  - Avoid friction and shearing  - Provide appropriate hygiene as needed including keeping skin clean and dry  - Evaluate need for skin moisturizer/barrier cream  - Collaborate with interdisciplinary team   - Patient/family teaching  - Consider wound care consult   Outcome: Progressing     Problem: PAIN - ADULT  Goal: Verbalizes/displays adequate comfort level or baseline comfort level  Description  Interventions:  - Encourage patient to monitor pain and request assistance  - Assess pain using appropriate pain scale  - Administer analgesics based on type and severity of pain and evaluate response  - Implement non-pharmacological measures as appropriate and evaluate response  - Consider cultural and social influences on pain and pain management  - Notify physician/advanced practitioner if interventions unsuccessful or patient reports new pain  Outcome: Progressing     Problem: DISCHARGE PLANNING  Goal: Discharge to home or other facility with appropriate resources  Description  INTERVENTIONS:  - Identify barriers to discharge w/patient and caregiver  - Arrange for needed discharge resources and transportation as appropriate  - Identify discharge learning needs (meds, wound care, etc )  - Arrange for interpretive services to assist at discharge as needed  - Refer to Case Management Department for coordinating discharge planning if the patient needs post-hospital services based on physician/advanced practitioner order or complex needs related to functional status, cognitive ability, or social support system  Outcome: Progressing     Problem: Knowledge Deficit  Goal: Patient/family/caregiver demonstrates understanding of disease process, treatment plan, medications, and discharge instructions  Description  Complete learning assessment and assess knowledge base    Interventions:  - Provide teaching at level of understanding  - Provide teaching via preferred learning methods  Outcome: Progressing     Problem: CARDIOVASCULAR - ADULT  Goal: Maintains optimal cardiac output and hemodynamic stability  Description  INTERVENTIONS:  - Monitor I/O, vital signs and rhythm  - Monitor for S/S and trends of decreased cardiac output  - Administer and titrate ordered vasoactive medications to optimize hemodynamic stability  - Assess quality of pulses, skin color and temperature  - Assess for signs of decreased coronary artery perfusion  - Instruct patient to report change in severity of symptoms  Outcome: Progressing  Goal: Absence of cardiac dysrhythmias or at baseline rhythm  Description  INTERVENTIONS:  - Continuous cardiac monitoring, vital signs, obtain 12 lead EKG if ordered  - Administer antiarrhythmic and heart rate control medications as ordered  - Monitor electrolytes and administer replacement therapy as ordered  Outcome: Progressing     Problem: RESPIRATORY - ADULT  Goal: Achieves optimal ventilation and oxygenation  Description  INTERVENTIONS:  - Assess for changes in respiratory status  - Assess for changes in mentation and behavior  - Position to facilitate oxygenation and minimize respiratory effort  - Oxygen administered by appropriate delivery if ordered  - Initiate smoking cessation education as indicated  - Encourage broncho-pulmonary hygiene including cough, deep breathe, Incentive Spirometry  - Assess the need for suctioning and aspirate as needed  - Assess and instruct to report SOB or any respiratory difficulty  - Respiratory Therapy support as indicated  Outcome: Progressing     Problem: GASTROINTESTINAL - ADULT  Goal: Minimal or absence of nausea and/or vomiting  Description  INTERVENTIONS:  - Administer IV fluids if ordered to ensure adequate hydration  - Maintain NPO status until nausea and vomiting are resolved  - Nasogastric tube if ordered  - Administer ordered antiemetic medications as needed  - Provide nonpharmacologic comfort measures as appropriate  - Advance diet as tolerated, if ordered  - Consider nutrition services referral to assist patient with adequate nutrition and appropriate food choices  Outcome: Progressing  Goal: Maintains adequate nutritional intake  Description  INTERVENTIONS:  - Monitor percentage of each meal consumed  - Identify factors contributing to decreased intake, treat as appropriate  - Assist with meals as needed  - Monitor I&O, weight, and lab values if indicated  - Obtain nutrition services referral as needed  Outcome: Progressing     Problem: GENITOURINARY - ADULT  Goal: Maintains or returns to baseline urinary function  Description  INTERVENTIONS:  - Assess urinary function  - Encourage oral fluids to ensure adequate hydration if ordered  - Administer IV fluids as ordered to ensure adequate hydration  - Administer ordered medications as needed  - Offer frequent toileting  - Follow urinary retention protocol if ordered  Outcome: Progressing  Goal: Urinary catheter remains patent  Description  INTERVENTIONS:  - Assess patency of urinary catheter  - If patient has a chronic roberts, consider changing catheter if non-functioning  - Follow guidelines for intermittent irrigation of non-functioning urinary catheter  Outcome: Progressing     Problem: MUSCULOSKELETAL - ADULT  Goal: Maintain or return mobility to safest level of function  Description  INTERVENTIONS:  - Assess patient's ability to carry out ADLs; assess patient's baseline for ADL function and identify physical deficits which impact ability to perform ADLs (bathing, care of mouth/teeth, toileting, grooming, dressing, etc )  - Assess/evaluate cause of self-care deficits   - Assess range of motion  - Assess patient's mobility  - Assess patient's need for assistive devices and provide as appropriate  - Encourage maximum independence but intervene and supervise when necessary  - Involve family in performance of ADLs  - Assess for home care needs following discharge   - Consider OT consult to assist with ADL evaluation and planning for discharge  - Provide patient education as appropriate  Outcome: Progressing

## 2020-01-29 NOTE — H&P
H&P- Carlene Booth 1933, 80 y o  female MRN: [id number]    Unit/Bed#: [facility] 2 -02 Encounter: [id number]    Primary Care Provider: No primary care provider on file  Date and time admitted to hospital: 1/28/2020  5:25 PM        Diabetes [facility])  Assessment & Plan  Lab Results   Component Value Date    HGBA1C 6 7 (H) 11/05/2014       Recent Labs     01/28/20  1823   POCGLU 198*       Blood Sugar Average: Last 72 hrs:  (P) 198     Order accuchecks with sliding scale  Watch closely for hypoglycemia with recent decreased PO intake    Dementia (HCC)  Assessment & Plan  Has been declining per family    HTN (hypertension)  Assessment & Plan  Continue coreg    CKD (chronic kidney disease)  Assessment & Plan  Creatinine 1 07    CHF (congestive heart failure) ([facility])  Assessment & Plan  Wt Readings from Last 3 Encounters:   12/11/14 71 2 kg (157 lb)   11/21/14 70 3 kg (155 lb)   10/27/14 70 8 kg (156 lb)       Does not appear fluid overloaded  EF 20-25%  Hold lasix for now in setting of dehydration      * Altered mental status  Assessment & Plan  Admit to med/surg  Patient with gradual decline over weeks to months  Reviewed medical records from recent admissions to [facility] and had long discussion with daughter at bedside  Ultimate goal is for patient to continue living at home with daughter   Patient had complete workup at [facility] recently including MRI brain which was negative for acute infarct but did show small vessel disease  Also had ECHO which showed EF of 20-25%  Patient has been generally weak  Unable to participate with home physical therapy recently     Does appear dehydrated  Will also check for UTI, although she does not have a fever or elevated WBC  She is DNR/DNI  Will consult palliative care      VTE Prophylaxis: Enoxaparin (Lovenox)  / sequential compression device   Code Status: DNR/DNI  POLST: There is no POLST form on file for this patient (pre-hospital)  Discussion with family: daughter and son in law at bedside    Anticipated Length of Stay:  Patient will be admitted on an Inpatient basis with an anticipated length of stay of  Greater than 2 midnights  Justification for Hospital Stay: patient requires palliative care consult    Total Time for Visit, including Counseling / Coordination of Care: 45 minutes  Greater than 50% of this total time spent on direct patient counseling and coordination of care  Chief Complaint:   Failure to thrive    History of Present Illness:    Carlene Booth is a 80 y o  female who presents with failure to thrive  Patient was brought in by daughter and son in law  Patient normally follows at [address] [place] [address] and had 2 recent hospitalizations there for similar complaints  Complaints include: generalized weakness, worsening confusion, decreased appetite  Family states they brought her here because they thought the wait in the ED would be shorter and in the past she received kind care from the nurses here  Daughter states that although the symptoms are the same, they seem to be getting worse  Today patient was unable to get out of bed  She kept leaning to the right or backward because she was too weak to sit up  She seemed to confused or unable to focus enough to eat  She was just letting the food sit in her mouth  She did fall last Wednesday  She had fallen in the past and sustained rib fractures but the daughter states she doesn't complain about that anymore  She had an episode at [facility] where she had garbled speech and had an MRI which was negative for stroke  Daughter is caring for the patient at home  She currently has a part time aid at home  After her last hospitalization at [address] [place] [address] she was referred to outpatient palliative care but daughter states no one ever discussed what this entailed with her  That appointment is the end of February       Review of Systems:    Review of Systems   Unable to perform ROS: Dementia       Past Medical and Surgical History:     Past Medical History: Diagnosis Date    Pacemaker        History reviewed  No pertinent surgical history  Meds/Allergies:    Prior to Admission medications    Medication Sig Start Date End Date Taking? Authorizing Provider   aspirin 81 mg chewable tablet Chew 81 mg daily   Yes Historical Provider, MD   atorvastatin (LIPITOR) 20 mg tablet Take 20 mg by mouth daily   Yes Historical Provider, MD   calcitriol (ROCALTROL) 0 25 mcg capsule Take 0 25 mcg by mouth 3 (three) times a week   Yes Historical Provider, MD   carvedilol (COREG) 3 125 mg tablet Take 3 125 mg by mouth 2 (two) times a day with meals   Yes Historical Provider, MD   cycloSPORINE (RESTASIS) 0 05 % ophthalmic emulsion 1 drop 2 (two) times a day   Yes Historical Provider, MD   DULoxetine (CYMBALTA) 30 mg delayed release capsule Take 30 mg by mouth daily   Yes Historical Provider, MD   ferrous sulfate 325 (65 Fe) mg tablet Take 325 mg by mouth daily at bedtime   Yes Historical Provider, MD   furosemide (LASIX) 10 mg/mL oral solution Take by mouth 3 (three) times a week   Yes Historical Provider, MD   levothyroxine 50 mcg tablet Take 50 mcg by mouth daily   Yes Historical Provider, MD   magnesium oxide (MAG-OX) 400 mg Take 400 mg by mouth daily after breakfast   Yes Historical Provider, MD   omeprazole (PriLOSEC) 20 mg delayed release capsule Take 20 mg by mouth every other day   Yes Historical Provider, MD   sitaGLIPtin (JANUVIA) 50 mg tablet Take 50 mg by mouth daily   Yes Historical Provider, MD     I have reviewed home medications with patient family member  Allergies: No Known Allergies    Social History:     Marital Status:     Occupation: retired  Patient Pre-hospital Living Situation: lives with daughter  Patient Pre-hospital Level of Mobility: minimal ambulator  Patient Pre-hospital Diet Restrictions: none  Substance Use History:   Social History     Substance and Sexual Activity   Alcohol Use Never    Frequency: Never     Social History     Tobacco Use Smoking Status Never Smoker   Smokeless Tobacco Never Used     Social History     Substance and Sexual Activity   Drug Use Never       Family History:    non-contributory    Physical Exam:     Vitals:   Blood Pressure: (!) 183/74 (01/28/20 2037)  Pulse: 68 (01/28/20 2037)  Temperature: 97 8 °F (36 6 °C) (01/28/20 2037)  Temp Source: Oral (01/28/20 1736)  Respirations: 16 (01/28/20 2037)  SpO2: 100 % (01/28/20 2037)    Physical Exam   Constitutional: No distress  Frail appearing   HENT:   Head: Normocephalic and atraumatic  Eyes: Pupils are equal, round, and reactive to light  EOM are normal    Neck: Normal range of motion  Neck supple  Cardiovascular: Normal rate and regular rhythm  Pulmonary/Chest: Effort normal and breath sounds normal    Abdominal: Soft  Bowel sounds are normal  She exhibits no distension  There is no tenderness  Musculoskeletal: Normal range of motion  Right foot mild swelling   Neurological: She is alert  Mild confusion  Oriented to person   Skin: Skin is warm and dry  She is not diaphoretic  Bruise on buttock from prehospital fall   Psychiatric: She has a normal mood and affect  Her behavior is normal    Vitals reviewed  Additional Data:     Lab Results: I have personally reviewed pertinent reports        Results from last 7 days   Lab Units 01/28/20  1836   WBC Thousand/uL 7 45   HEMOGLOBIN g/dL 11 8   HEMATOCRIT % 35 7   PLATELETS Thousands/uL 139*   NEUTROS PCT % 84*   LYMPHS PCT % 8*   MONOS PCT % 7   EOS PCT % 1     Results from last 7 days   Lab Units 01/28/20  1836   SODIUM mmol/L 140   POTASSIUM mmol/L 4 0   CHLORIDE mmol/L 103   CO2 mmol/L 32   BUN mg/dL 30*   CREATININE mg/dL 1 07   ANION GAP mmol/L 5   CALCIUM mg/dL 9 8   ALBUMIN g/dL 3 4*   TOTAL BILIRUBIN mg/dL 0 97   ALK PHOS U/L 71   ALT U/L 9*   AST U/L 16   GLUCOSE RANDOM mg/dL 216*     Results from last 7 days   Lab Units 01/28/20  1836   INR  1 02     Results from last 7 days   Lab Units 01/28/20  1823   POC GLUCOSE mg/dl 198*               Imaging: I have personally reviewed pertinent reports  CT head without contrast   Final Result by [facility],  (01/28 1938)      No acute intracranial abnormality  Microangiopathic changes  Workstation performed: [id number]         XR chest 2 views    (Results Pending)   XR ankle 3+ vw right    (Results Pending)       EKG, Pathology, and Other Studies Reviewed on Admission:   · EKG: NSR    Allscripts / Epic Records Reviewed: Yes     ** Please Note: This note has been constructed using a voice recognition system   **

## 2020-01-30 LAB
GLUCOSE SERPL-MCNC: 184 MG/DL (ref 65–140)
GLUCOSE SERPL-MCNC: 202 MG/DL (ref 65–140)
GLUCOSE SERPL-MCNC: 243 MG/DL (ref 65–140)
GLUCOSE SERPL-MCNC: 96 MG/DL (ref 65–140)

## 2020-01-30 PROCEDURE — 99232 SBSQ HOSP IP/OBS MODERATE 35: CPT | Performed by: HOSPITALIST

## 2020-01-30 PROCEDURE — 82948 REAGENT STRIP/BLOOD GLUCOSE: CPT

## 2020-01-30 PROCEDURE — 92526 ORAL FUNCTION THERAPY: CPT

## 2020-01-30 RX ADMIN — ENOXAPARIN SODIUM 40 MG: 40 INJECTION SUBCUTANEOUS at 08:24

## 2020-01-30 RX ADMIN — ASPIRIN 81 MG 81 MG: 81 TABLET ORAL at 08:25

## 2020-01-30 RX ADMIN — LEVOTHYROXINE SODIUM 50 MCG: 50 TABLET ORAL at 05:28

## 2020-01-30 RX ADMIN — PANTOPRAZOLE SODIUM 20 MG: 20 TABLET, DELAYED RELEASE ORAL at 05:28

## 2020-01-30 RX ADMIN — CARVEDILOL 3.12 MG: 3.12 TABLET, FILM COATED ORAL at 17:45

## 2020-01-30 RX ADMIN — CARVEDILOL 3.12 MG: 3.12 TABLET, FILM COATED ORAL at 08:25

## 2020-01-30 RX ADMIN — INSULIN LISPRO 2 UNITS: 100 INJECTION, SOLUTION INTRAVENOUS; SUBCUTANEOUS at 13:48

## 2020-01-30 RX ADMIN — FERROUS SULFATE TAB 325 MG (65 MG ELEMENTAL FE) 325 MG: 325 (65 FE) TAB at 21:29

## 2020-01-30 RX ADMIN — ACETAMINOPHEN 650 MG: 325 TABLET ORAL at 11:57

## 2020-01-30 RX ADMIN — INSULIN LISPRO 1 UNITS: 100 INJECTION, SOLUTION INTRAVENOUS; SUBCUTANEOUS at 17:47

## 2020-01-30 RX ADMIN — DEXTRAN 70 AND HYPROMELLOSE 2910 1 DROP: 1; 3 SOLUTION/ DROPS OPHTHALMIC at 21:29

## 2020-01-30 RX ADMIN — ATORVASTATIN CALCIUM 20 MG: 20 TABLET, FILM COATED ORAL at 17:46

## 2020-01-30 NOTE — DISCHARGE INSTR - DIET
Dysphagia 2 mechanical soft and thin liquids  Remove unnecessary items from view   Limit environmental distractions (tv, noise)

## 2020-01-30 NOTE — SPEECH THERAPY NOTE
Speech Language/Pathology    Speech/Language Pathology Progress Note    Patient Name: Clari Sierra  GJYJL'G Date: 1/30/2020     Problem List  Principal Problem:    Altered mental status  Active Problems:    CHF (congestive heart failure) (HCC)    CKD (chronic kidney disease)    HTN (hypertension)    Dementia ([facility] [address] )    Diabetes ([facility] )       Past Medical History  Past Medical History:   Diagnosis Date    Pacemaker         Past Surgical History  History reviewed  No pertinent surgical history  Subjective:  More alert and verbal today  "I want a real banana"  Very distracted by her surroundings  Unnecessary items removed from tray  Objective:  Seen for po tolerance and further recommendations  trialed a banan w/ patient  Needed much cuing to redirect her back to taking a bite  Pt took small bites  Mastication was adequate and transfer was much more timely  Today  trialed thin OJ  Transfer and swallow were also much more prompt today  No s/s aspiration  Assessment:  More alert and verbal, tolerated banana and thin  Plan/Recommendations:  Upgrade to dysphagia 2 mechanical soft and thin liquids   Limit environmental distractions (tv, objects)

## 2020-01-30 NOTE — SOCIAL WORK
Pt is not hospice appropriate at this time- daughter would like [facility] for PT/OT services with agency made aware of same  Palliative Care to follow as an OP with information placed on AVS   Daughter requesting a Hospital Bed and [facility] with referral to [facility] made  PT to work with pt- pending her functionality will determine if family to transport vs need of [facility] (agreeable to cost of same)    Will continue to follow to assist with dx poc

## 2020-01-30 NOTE — PROGRESS NOTES
Progress Note - Yesenia Files 1933, 80 y o  female MRN: [id number]    Unit/Bed#: [facility] 2 -02 Encounter: [id number]    Primary Care Provider: No primary care provider on file  Date and time admitted to hospital: 2020  5:25 PM        Dementia [facility])  Assessment & Plan  Going home with family    * Altered mental status  Assessment & Plan  Due to dementia and dehydration    She would benefit from a hospital bed at home due to decreased mobility, dementia, and aspiration risk          Subjective:   Feels better  Has a little more energy  Still confused at baseline  Objective:     Vitals:   Temp (24hrs), Av 7 °F (37 1 °C), Min:98 3 °F (36 8 °C), Max:99 1 °F (37 3 °C)    Temp:  [98 3 °F (36 8 °C)-99 1 °F (37 3 °C)] 99 1 °F (37 3 °C)  HR:  [69-72] 72  Resp:  [16-18] 18  BP: (146-149)/(67) 149/67  SpO2:  [95 %-97 %] 97 %  There is no height or weight on file to calculate BMI  Input and Output Summary (last 24 hours): Intake/Output Summary (Last 24 hours) at 2020 1421  Last data filed at 2020 1029  Gross per 24 hour   Intake    Output 283 ml   Net -283 ml       Physical Exam:     Physical Exam   HENT:   Head: Normocephalic and atraumatic  Eyes: Pupils are equal, round, and reactive to light  EOM are normal    Cardiovascular: Normal rate and regular rhythm  Exam reveals no gallop and no friction rub  No murmur heard  Pulmonary/Chest: Effort normal and breath sounds normal  She has no wheezes  She has no rales  Abdominal: Soft  Bowel sounds are normal  There is no tenderness  Musculoskeletal: She exhibits no edema  Nursing note and vitals reviewed              Additional Data:     Labs:    Results from last 7 days   Lab Units 20  0342 20  1836   WBC Thousand/uL 6 22 7 45   HEMOGLOBIN g/dL 10 7* 11 8   HEMATOCRIT % 32 8* 35 7   PLATELETS Thousands/uL 118* 139*   NEUTROS PCT %  --  84*   LYMPHS PCT %  --  8*   MONOS PCT %  --  7   EOS PCT %  --  1 Results from last 7 days   Lab Units 01/29/20  0342 01/28/20  1836   POTASSIUM mmol/L 3 5 4 0   CHLORIDE mmol/L 106 103   CO2 mmol/L 33* 32   BUN mg/dL 26* 30*   CREATININE mg/dL 1 00 1 07   CALCIUM mg/dL 9 5 9 8   ALK PHOS U/L  --  71   ALT U/L  --  9*   AST U/L  --  16     Results from last 7 days   Lab Units 01/28/20  1836   INR  1 02     Results from last 7 days   Lab Units 01/30/20  1059 01/30/20  0729 01/29/20  2110 01/29/20  1555 01/29/20  1112 01/29/20  1056 01/29/20  0708 01/28/20  2236 01/28/20  1823   POC GLUCOSE mg/dl 243* 96 93 130 205* 430* 136 116 198*     Results from last 7 days   Lab Units 01/29/20  0345   HEMOGLOBIN A1C % 5 7           * I Have Reviewed All Lab Data     Recent Cultures (last 7 days):             Last 24 Hours Medication List:     Current Facility-Administered Medications:  acetaminophen 650 mg Oral Q6H PRN Ana Cuellar PA-C   aspirin 81 mg Oral Daily Ana Cuellar PA-C   atorvastatin 20 mg Oral Daily With ComARYAN jj   calcitriol 0 25 mcg Oral Once per day on Mon Wed Fri Ana Cuellar PA-C   carvedilol 3 125 mg Oral BID With Meals Ana Cuellar PA-C   dextran 70-hypromellose 1 drop Both Eyes Q12H [address] Ana Cuellar PA-C   DULoxetine 30 mg Oral Daily Ana Cuellar PA-C   enoxaparin 40 mg Subcutaneous Daily Ana Cuellar PA-C   ferrous sulfate 325 mg Oral HS Ana Cuellar PA-C   insulin lispro 1-5 Units Subcutaneous TID VANESSA Calvo PA-C   insulin lispro 1-5 Units Subcutaneous HS Ana Cuellar PA-C   levothyroxine 50 mcg Oral Early Morning Ana Cuellar PA-C   magnesium oxide 400 mg Oral After Breakfast Ana Cuellar PA-C   ondansetron 4 mg Intravenous Q6H PRN Ana Cuellar PA-C   pantoprazole 20 mg Oral Early Morning Ana Mayer, PA-C         VTE Pharmacologic Prophylaxis:   Pharmacologic: Enoxaparin (Lovenox)      Current Length of Stay: 2 day(s)    Current Patient Status: Inpatient       Discharge Plan: ready for discharge     Code Status: Level 3 - DNAR and DNI           Today, Patient Was Seen By: Chao Garcia DO    ** Please Note: Dictation voice to text software may have been used in the creation of this document   **

## 2020-01-30 NOTE — ASSESSMENT & PLAN NOTE
Due to dementia and dehydration    She would benefit from a hospital bed at home due to decreased mobility, dementia, and aspiration risk

## 2020-01-31 VITALS
HEART RATE: 60 BPM | TEMPERATURE: 97.3 F | OXYGEN SATURATION: 97 % | SYSTOLIC BLOOD PRESSURE: 141 MMHG | RESPIRATION RATE: 17 BRPM | DIASTOLIC BLOOD PRESSURE: 59 MMHG

## 2020-01-31 LAB
GLUCOSE SERPL-MCNC: 152 MG/DL (ref 65–140)
GLUCOSE SERPL-MCNC: 158 MG/DL (ref 65–140)

## 2020-01-31 PROCEDURE — 97530 THERAPEUTIC ACTIVITIES: CPT

## 2020-01-31 PROCEDURE — 82948 REAGENT STRIP/BLOOD GLUCOSE: CPT

## 2020-01-31 PROCEDURE — 99239 HOSP IP/OBS DSCHRG MGMT >30: CPT | Performed by: HOSPITALIST

## 2020-01-31 RX ADMIN — LEVOTHYROXINE SODIUM 50 MCG: 50 TABLET ORAL at 05:27

## 2020-01-31 RX ADMIN — DEXTRAN 70 AND HYPROMELLOSE 2910 1 DROP: 1; 3 SOLUTION/ DROPS OPHTHALMIC at 10:00

## 2020-01-31 RX ADMIN — DULOXETINE HYDROCHLORIDE 30 MG: 30 CAPSULE, DELAYED RELEASE ORAL at 10:00

## 2020-01-31 RX ADMIN — PANTOPRAZOLE SODIUM 20 MG: 20 TABLET, DELAYED RELEASE ORAL at 05:27

## 2020-01-31 RX ADMIN — CARVEDILOL 3.12 MG: 3.12 TABLET, FILM COATED ORAL at 08:00

## 2020-01-31 RX ADMIN — ENOXAPARIN SODIUM 40 MG: 40 INJECTION SUBCUTANEOUS at 10:00

## 2020-01-31 RX ADMIN — INSULIN LISPRO 1 UNITS: 100 INJECTION, SOLUTION INTRAVENOUS; SUBCUTANEOUS at 10:00

## 2020-01-31 RX ADMIN — MAGNESIUM GLUCONATE 500 MG ORAL TABLET 400 MG: 500 TABLET ORAL at 10:00

## 2020-01-31 RX ADMIN — CALCITRIOL CAPSULES 0.25 MCG 0.25 MCG: 0.25 CAPSULE ORAL at 10:00

## 2020-01-31 RX ADMIN — ASPIRIN 81 MG 81 MG: 81 TABLET ORAL at 10:00

## 2020-01-31 NOTE — PHYSICAL THERAPY NOTE
PHYSICAL THERAPY NOTE          Patient Name: Aldo Canela  CSJAS'V Date: 1/31/2020   Time In: 1146 Time Out: 1200       01/31/20 1200   Pain Assessment   Pain Assessment No/denies pain   Pain Score No Pain   Restrictions/Precautions   Other Precautions Cognitive;Multiple lines;Telemetry; Fall Risk;Bed Alarm   General   Chart Reviewed Yes   Additional Pertinent History Pt is 79 y/o female admitted with failure to thrive  Change in mental status  Weakness, decreased po intake  Recommendation at time of IE was STR however pt expected to d/c home w HHPT today   Response to Previous Treatment Patient with no complaints from previous session  Family/Caregiver Present Yes  (dtr, granddtr)   Cognition   Overall Cognitive Status Impaired   Arousal/Participation Cooperative   Attention Attends with cues to redirect   Orientation Level Oriented to person;Disoriented to time;Disoriented to situation;Oriented to place   Memory Decreased recall of precautions;Decreased recall of recent events;Decreased short term memory   Following Commands Follows one step commands with increased time or repetition   Comments pt requires constant cuing throughout session for safety and technique  inconsistent follow through   Subjective   Subjective pt family requesting PT session prior to d/c    Bed Mobility   Rolling R 3  Moderate assistance   Additional items Assist x 1;Bedrails; Increased time required;Verbal cues   Supine to Sit 3  Moderate assistance   Additional items Assist x 2;HOB elevated; Bedrails; Increased time required;Verbal cues;LE management; Other  (trunk support)   Sit to Supine 2  Maximal assistance   Additional items Assist x 2; Increased time required;Verbal cues;LE management; Other  (trunk support)   Additional Comments pt easily distracted and require cues to redirect attention to task   Pt require max A to move BLEs off EOB as well as to support trunk during transf- pt w no observed efforts at trunk control during bed mobility   Transfers   Sit to Stand 2  Maximal assistance   Additional items Assist x 2; Increased time required;Verbal cues; Other  (RW)   Stand to Sit 2  Maximal assistance   Additional items Assist x 2;Verbal cues; Other;Impulsive  (RW)   Additional Comments pt w decreased UE support during transf, max Ax2 w constant cuing for safety  Upon initial standing pt w heavy retropulsion, maxAx2 to maintain upright w cues for positioning of LEs, pt reporting fear of falling and require cues to redirect attention to task  pt impuslive to sit due to poor LE support, fear of falling/cognitive impairments   Ambulation/Elevation   Gait pattern Improper Weight shift;Narrow JENNIFER; Forward Flexion;Decreased foot clearance; Short stride; Excessively slow;L Foot drag;Poor UE support;Retropulsion   Gait Assistance 2  Maximal assist   Additional items Assist x 2;Verbal cues; Tactile cues  (for wt shifting, posture, safety, etc )   Assistive Device Rolling walker   Distance L foot drag, R foot x2 side steps   Balance   Static Sitting Fair -   Static Standing Zero  (Ax2 w RW)   Dynamic Standing Poor -  (Ax2 w RW)   Ambulatory Zero  (Ax2 w RW)   Endurance Deficit   Endurance Deficit Yes   Endurance Deficit Description weakness, cognition   Activity Tolerance   Activity Tolerance Patient limited by fatigue; Other (Comment)  (cognition- fear of falling)   Medical Staff Made Aware CM, nsg, PCA   Nurse Made Aware yes   Assessment   Prognosis Guarded   Problem List Decreased strength;Decreased range of motion;Decreased endurance; Impaired balance;Decreased mobility; Decreased cognition;Decreased coordination; Impaired judgement;Decreased safety awareness; Impaired hearing;Pain;Decreased skin integrity   Assessment Per family request, Bianca Speaker was seen for a follow session prior to d/c home   Discussed w family what their expectations were for session, pt dtr stated that she hopes she can see the patient walk so that she can be able to walk short distances to the commode/chair and back to bed  Discussed in length w pt dtr and granddtr pt mobility status at time of IE, including deference of mobility due to safety concerns as well as recommendations for STR given pt current functional status including Ax2, non ambulatory and safety concerns  Pt dtr verbalize understanding and states that pt will utilize wc upon d/c  Pt dtr also reporting that at time of d/c pt will not have 24/7 supervision or Ax2  Pt demonstrates mild improvements in bed mobility however requires constant cuing due to cognitive impairments  Pt cont to require max Ax2 for transf  Upon standing pt w significant retropulsion due to fear of falling and zero upright balance w/o max Ax2 and RW support  Pt unable to clear LLE off ground and demonstrates L foot drag during gait trial  Pt with poor motor control of RLE however able to take one-two small side steps toward [facility] w tactile cues to wt shift, verbal cues for technique and safety  Pt impulsive to sit after one small step and require max Ax2 to transf and return to bed  End of session pt dtr and granddtr stated no questions for PT  Reinforced to dtr that pt does require max Ax2 persons to perform mobility tasks and is currently nonambulatory; dtr verbalize understanding  Recommended to dtr that pt have 24/7 supervision given fall risk and cognitive deficits, and that if dtr should plan to mobilize pt, have at last two people present due to fall risk of pt and to decrease caregiver burden/injury; dtr verbalize understanding  Recommendation continues to be for STR given pt non ambulatory status, fall risk and social and environmental barriers  If pt is to d/c home, recommend 24/7 supervision, HHPT and renée for OOB mobility to maximize pt and caregiver safety  CM updated      Barriers to Discharge Inaccessible home environment;Decreased caregiver support   Barriers to Discharge Comments non ambulatory and decreased mobility compared to baseline, require A x2 for mobility   Goals   Patient Goals none noted 2* cognition   STG Expiration Date 02/08/20   Short Term Goal #1 1)  Pt will perform bed mobility with modA  in order to improve function and lessen caregiver assistance  2)  Perform all transfers with modA  in order to improve ability to negotiate safely in home environment with less reliance on caregiver  3) Amb with least restrictive AD > 40'x1 with mod A in order to demonstrate ability to negotiate in home environment with less assistance from caregiver  4)  Improve overall strength and balance 1/2 grade in order to optimize ability to perform functional tasks and reduce fall risk  5) Increase activity tolerance to 30 minutes in order to improve endurance to functional tasks  6)  Negotiate stairs using most appropriate technique and modA in order to be able to negotiate safely in home environment  7) PT for ongoing patient and family/caregiver education, DME needs and d/c planning in order to promote highest level of function in least restrictive environment  PT Treatment Day 1   Plan   Treatment/Interventions Functional transfer training;LE strengthening/ROM; Elevations; Therapeutic exercise; Endurance training;Patient/family training;Equipment eval/education; Bed mobility;Gait training; Compensatory technique education;Continued evaluation;Spoke to nursing;Spoke to case management; Family;Cognitive reorientation   Progress Slow progress, cognitive deficits   PT Frequency Other (Comment)  (3-5/wk)   Recommendation   Recommendation Short-term skilled PT   Equipment Recommended Other (Comment)  (renée for safe OOB mobility)   PT - OK to Discharge Yes   Additional Comments to [address], [address], PT

## 2020-01-31 NOTE — SOCIAL WORK
Therapy continues to recommend STR with the family wanting to take pt home w/ HHPT/OT  It is recommended a renée lift be obtained to assist with transfers as currently a max asst of 2 to transfer- referral made for same  Transportation arranged via S for home @ [address] with CMN completed and in pt's folder  RN/PT family aware of same  No further d/c needs identified  No

## 2020-01-31 NOTE — ASSESSMENT & PLAN NOTE
Wt Readings from Last 3 Encounters:   12/11/14 71 2 kg (157 lb)   11/21/14 70 3 kg (155 lb)   10/27/14 70 8 kg (156 lb)       She is euvolemic  This is chronic  I would keep her off of lasix due to her poor oral intake

## 2020-01-31 NOTE — PLAN OF CARE
Problem: PHYSICAL THERAPY ADULT  Goal: Performs mobility at highest level of function for planned discharge setting  See evaluation for individualized goals  Description  Treatment/Interventions: Functional transfer training, LE strengthening/ROM, Elevations, Therapeutic exercise, Endurance training, Patient/family training, Equipment eval/education, Bed mobility, Gait training, Compensatory technique education, Continued evaluation, Spoke to nursing, OT  Equipment Recommended: (monitor)       See flowsheet documentation for full assessment, interventions and recommendations  Outcome: Not Progressing  Note:   Prognosis: Guarded  Problem List: Decreased strength, Decreased range of motion, Decreased endurance, Impaired balance, Decreased mobility, Decreased cognition, Decreased coordination, Impaired judgement, Decreased safety awareness, Impaired hearing, Pain, Decreased skin integrity  Assessment: Per family request, Vincenzo Cho was seen for a follow session prior to d/c home  Discussed w family what their expectations were for session, pt dtr stated that she hopes she can see the patient walk so that she can be able to walk short distances to the commode/chair and back to bed  Discussed in length w pt dtr and granddtr pt mobility status at time of IE, including deference of mobility due to safety concerns as well as recommendations for STR given pt current functional status including Ax2, non ambulatory and safety concerns  Pt dtr verbalize understanding and states that pt will utilize wc upon d/c  Pt dtr also reporting that at time of d/c pt will not have 24/7 supervision or Ax2  Pt demonstrates mild improvements in bed mobility however requires constant cuing due to cognitive impairments  Pt cont to require max Ax2 for transf  Upon standing pt w significant retropulsion due to fear of falling and zero upright balance w/o max Ax2 and RW support   Pt unable to clear LLE off ground and demonstrates L foot drag during gait trial  Pt with poor motor control of RLE however able to take one-two small side steps toward [facility] w tactile cues to wt shift, verbal cues for technique and safety  Pt impulsive to sit after one small step and require max Ax2 to transf and return to bed  End of session pt dtr and granddtr stated no questions for PT  Reinforced to dtr that pt does require max Ax2 persons to perform mobility tasks and is currently nonambulatory; dtr verbalize understanding  Recommended to dtr that pt have 24/7 supervision given fall risk and cognitive deficits, and that if dtr should plan to mobilize pt, have at last two people present due to fall risk of pt and to decrease caregiver burden/injury; dtr verbalize understanding  Recommendation continues to be for STR given pt non ambulatory status, fall risk and social and environmental barriers  If pt is to d/c home, recommend 24/7 supervision, HHPT and renée for OOB mobility to maximize pt and caregiver safety  CM updated  Barriers to Discharge: Inaccessible home environment, Decreased caregiver support  Barriers to Discharge Comments: non ambulatory and decreased mobility compared to baseline, require A x2 for mobility  Recommendation: Short-term skilled PT     PT - OK to Discharge: Yes    See flowsheet documentation for full assessment

## 2020-01-31 NOTE — SOCIAL WORK
Confirmed with pt's daughter [employer] delivery of hospital bed to the home with the [facility] delivered to bedside and daughter taking it home last evening  Request for PT to see prior to d/c- will arrange transport home thereafter according to functionality

## 2020-01-31 NOTE — PLAN OF CARE
Problem: Potential for Falls  Goal: Patient will remain free of falls  Description  INTERVENTIONS:  - Assess patient frequently for physical needs  -  Identify cognitive and physical deficits and behaviors that affect risk of falls    -  [place] fall precautions as indicated by assessment   - Educate patient/family on patient safety including physical limitations  - Instruct patient to call for assistance with activity based on assessment  - Modify environment to reduce risk of injury  - Consider OT/PT consult to assist with strengthening/mobility  Outcome: Progressing     Problem: Prexisting or High Potential for Compromised Skin Integrity  Goal: Skin integrity is maintained or improved  Description  INTERVENTIONS:  - Identify patients at risk for skin breakdown  - Assess and monitor skin integrity  - Assess and monitor nutrition and hydration status  - Monitor labs   - Assess for incontinence   - Turn and reposition patient  - Assist with mobility/ambulation  - Relieve pressure over bony prominences  - Avoid friction and shearing  - Provide appropriate hygiene as needed including keeping skin clean and dry  - Evaluate need for skin moisturizer/barrier cream  - Collaborate with interdisciplinary team   - Patient/family teaching  - Consider wound care consult   Outcome: Progressing     Problem: PAIN - ADULT  Goal: Verbalizes/displays adequate comfort level or baseline comfort level  Description  Interventions:  - Encourage patient to monitor pain and request assistance  - Assess pain using appropriate pain scale  - Administer analgesics based on type and severity of pain and evaluate response  - Implement non-pharmacological measures as appropriate and evaluate response  - Consider cultural and social influences on pain and pain management  - Notify physician/advanced practitioner if interventions unsuccessful or patient reports new pain  Outcome: Progressing     Problem: DISCHARGE PLANNING  Goal: Discharge to home or other facility with appropriate resources  Description  INTERVENTIONS:  - Identify barriers to discharge w/patient and caregiver  - Arrange for needed discharge resources and transportation as appropriate  - Identify discharge learning needs (meds, wound care, etc )  - Arrange for interpretive services to assist at discharge as needed  - Refer to Case Management Department for coordinating discharge planning if the patient needs post-hospital services based on physician/advanced practitioner order or complex needs related to functional status, cognitive ability, or social support system  Outcome: Progressing     Problem: Knowledge Deficit  Goal: Patient/family/caregiver demonstrates understanding of disease process, treatment plan, medications, and discharge instructions  Description  Complete learning assessment and assess knowledge base    Interventions:  - Provide teaching at level of understanding  - Provide teaching via preferred learning methods  Outcome: Progressing     Problem: CARDIOVASCULAR - ADULT  Goal: Maintains optimal cardiac output and hemodynamic stability  Description  INTERVENTIONS:  - Monitor I/O, vital signs and rhythm  - Monitor for S/S and trends of decreased cardiac output  - Administer and titrate ordered vasoactive medications to optimize hemodynamic stability  - Assess quality of pulses, skin color and temperature  - Assess for signs of decreased coronary artery perfusion  - Instruct patient to report change in severity of symptoms  Outcome: Progressing  Goal: Absence of cardiac dysrhythmias or at baseline rhythm  Description  INTERVENTIONS:  - Continuous cardiac monitoring, vital signs, obtain 12 lead EKG if ordered  - Administer antiarrhythmic and heart rate control medications as ordered  - Monitor electrolytes and administer replacement therapy as ordered  Outcome: Progressing     Problem: RESPIRATORY - ADULT  Goal: Achieves optimal ventilation and oxygenation  Description  INTERVENTIONS:  - Assess for changes in respiratory status  - Assess for changes in mentation and behavior  - Position to facilitate oxygenation and minimize respiratory effort  - Oxygen administered by appropriate delivery if ordered  - Initiate smoking cessation education as indicated  - Encourage broncho-pulmonary hygiene including cough, deep breathe, Incentive Spirometry  - Assess the need for suctioning and aspirate as needed  - Assess and instruct to report SOB or any respiratory difficulty  - Respiratory Therapy support as indicated  Outcome: Progressing     Problem: GASTROINTESTINAL - ADULT  Goal: Minimal or absence of nausea and/or vomiting  Description  INTERVENTIONS:  - Administer IV fluids if ordered to ensure adequate hydration  - Maintain NPO status until nausea and vomiting are resolved  - Nasogastric tube if ordered  - Administer ordered antiemetic medications as needed  - Provide nonpharmacologic comfort measures as appropriate  - Advance diet as tolerated, if ordered  - Consider nutrition services referral to assist patient with adequate nutrition and appropriate food choices  Outcome: Progressing  Goal: Maintains adequate nutritional intake  Description  INTERVENTIONS:  - Monitor percentage of each meal consumed  - Identify factors contributing to decreased intake, treat as appropriate  - Assist with meals as needed  - Monitor I&O, weight, and lab values if indicated  - Obtain nutrition services referral as needed  Outcome: Progressing     Problem: GENITOURINARY - ADULT  Goal: Maintains or returns to baseline urinary function  Description  INTERVENTIONS:  - Assess urinary function  - Encourage oral fluids to ensure adequate hydration if ordered  - Administer IV fluids as ordered to ensure adequate hydration  - Administer ordered medications as needed  - Offer frequent toileting  - Follow urinary retention protocol if ordered  Outcome: Progressing  Goal: Urinary catheter remains patent  Description  INTERVENTIONS:  - Assess patency of urinary catheter  - If patient has a chronic roberts, consider changing catheter if non-functioning  - Follow guidelines for intermittent irrigation of non-functioning urinary catheter  Outcome: Progressing     Problem: MUSCULOSKELETAL - ADULT  Goal: Maintain or return mobility to safest level of function  Description  INTERVENTIONS:  - Assess patient's ability to carry out ADLs; assess patient's baseline for ADL function and identify physical deficits which impact ability to perform ADLs (bathing, care of mouth/teeth, toileting, grooming, dressing, etc )  - Assess/evaluate cause of self-care deficits   - Assess range of motion  - Assess patient's mobility  - Assess patient's need for assistive devices and provide as appropriate  - Encourage maximum independence but intervene and supervise when necessary  - Involve family in performance of ADLs  - Assess for home care needs following discharge   - Consider OT consult to assist with ADL evaluation and planning for discharge  - Provide patient education as appropriate  Outcome: Progressing     Problem: Nutrition/Hydration-ADULT  Goal: Nutrient/Hydration intake appropriate for improving, restoring or maintaining nutritional needs  Description  Monitor and assess patient's nutrition/hydration status for malnutrition  Collaborate with interdisciplinary team and initiate plan and interventions as ordered  Monitor patient's weight and dietary intake as ordered or per policy  Utilize nutrition screening tool and intervene as necessary  Determine patient's food preferences and provide high-protein, high-caloric foods as appropriate       INTERVENTIONS:  - Monitor oral intake, urinary output, labs, and treatment plans  - Assess nutrition and hydration status and recommend course of action  - Evaluate amount of meals eaten  - Assist patient with eating if necessary   - Allow adequate time for meals  - Recommend/ encourage appropriate diets, oral nutritional supplements, and vitamin/mineral supplements  - Order, calculate, and assess calorie counts as needed  - Recommend, monitor, and adjust tube feedings and TPN/PPN based on assessed needs  - Assess need for intravenous fluids  - Provide specific nutrition/hydration education as appropriate  - Include patient/family/caregiver in decisions related to nutrition  Outcome: Progressing

## 2020-01-31 NOTE — DISCHARGE SUMMARY
Discharge- Jennifer Bettencourt 1933, 80 y o  female MRN: [id number]    Unit/Bed#: [facility] [address] [id number] Encounter: [id number]    Primary Care Provider: No primary care provider on file  Date and time admitted to hospital: 1/28/2020  5:25 PM        Dementia [facility])  Assessment & Plan  Going home with family    CHF (congestive heart failure) ([facility] [address] )  Assessment & Plan  Wt Readings from Last 3 Encounters:   12/11/14 71 2 kg (157 lb)   11/21/14 70 3 kg (155 lb)   10/27/14 70 8 kg (156 lb)       She is euvolemic  This is chronic  I would keep her off of lasix due to her poor oral intake      * Altered mental status  Assessment & Plan  Due to dementia and dehydration    She is going home with family        Discharging Physician / Practitioner: James Blanco DO  PCP: No primary care provider on file  Admission Date:   Admission Orders (From admission, onward)     Ordered        01/28/20 1955  Inpatient Admission  Once                   Discharge Date: 01/31/20    Resolved Problems  Date Reviewed: 1/31/2020    None            Consultations During Hospital Stay:  · Palliative care    ·       Reason for Admission: confusion      Hospital Course:     Jennifer Bettencourt is a 80 y o  female patient who originally presented to the hospital on 1/28/2020 due to confusion  She has underlying dementia  She is felt to have some mild dehydration but most of this is progressive dementia  Nothing else we can really add to make this better  I did take her off of Lasix that she has poor oral intake and I think she would be prone to dehydration going forward  She is going to go home with family  She was seen by palliative care as she is nearing the end of her life  Please see above list of diagnoses and related plan for additional information  Condition at Discharge: fair       Discharge Day Visit / Exam:     Subjective:  Feels well  Just complains of generalized weakness        Vitals: Blood Pressure: 141/59 (01/31/20 5253)  Pulse: 60 (01/31/20 0722)  Temperature: (!) 97 3 °F (36 3 °C) (01/31/20 0722)  Temp Source: Oral (01/31/20 0722)  Respirations: 17 (01/31/20 0722)  SpO2: 97 % (01/31/20 0722)    Exam:     Physical Exam   HENT:   Head: Normocephalic and atraumatic  Eyes: Pupils are equal, round, and reactive to light  EOM are normal    Cardiovascular: Normal rate and regular rhythm  Exam reveals no gallop and no friction rub  No murmur heard  Pulmonary/Chest: Effort normal and breath sounds normal  She has no wheezes  She has no rales  Abdominal: Soft  Bowel sounds are normal  There is no tenderness  Musculoskeletal: She exhibits no edema  Nursing note and vitals reviewed            Discharge instructions/Information to patient and family:   See after visit summary for information provided to patient and family  Provisions for Follow-Up Care:  See after visit summary for information related to follow-up care and any pertinent home health orders  Disposition:     Home       Discharge Statement:  I spent 38 minutes discharging the patient  This time was spent on the day of discharge  I had direct contact with the patient on the day of discharge  Greater than 50% of the total time was spent examining patient, answering all patient questions, arranging and discussing plan of care with patient as well as directly providing post-discharge instructions  Additional time then spent on discharge activities  Discharge Medications:  See after visit summary for reconciled discharge medications provided to patient and family        ** Please Note: This note has been constructed using a voice recognition system **

## 2020-02-24 ENCOUNTER — APPOINTMENT (EMERGENCY)
Dept: CT IMAGING | Facility: HOSPITAL | Age: 85
End: 2020-02-24
Payer: COMMERCIAL

## 2020-02-24 ENCOUNTER — APPOINTMENT (EMERGENCY)
Dept: RADIOLOGY | Facility: HOSPITAL | Age: 85
End: 2020-02-24
Payer: COMMERCIAL

## 2020-02-24 ENCOUNTER — HOSPITAL ENCOUNTER (OUTPATIENT)
Facility: HOSPITAL | Age: 85
Setting detail: OBSERVATION
Discharge: HOSPICE | End: 2020-02-27
Attending: EMERGENCY MEDICINE | Admitting: INTERNAL MEDICINE
Payer: COMMERCIAL

## 2020-02-24 DIAGNOSIS — I25.5 ISCHEMIC CARDIOMYOPATHY: ICD-10-CM

## 2020-02-24 DIAGNOSIS — M25.569 KNEE PAIN: ICD-10-CM

## 2020-02-24 DIAGNOSIS — R26.2 AMBULATORY DYSFUNCTION: ICD-10-CM

## 2020-02-24 DIAGNOSIS — F03.90 DEMENTIA (HCC): ICD-10-CM

## 2020-02-24 DIAGNOSIS — R62.7 FAILURE TO THRIVE IN ADULT: ICD-10-CM

## 2020-02-24 DIAGNOSIS — N30.00 ACUTE CYSTITIS WITHOUT HEMATURIA: ICD-10-CM
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
## 2020-02-24 DIAGNOSIS — M25.561 ACUTE PAIN OF RIGHT KNEE: Primary | ICD-10-CM

## 2020-02-24 DIAGNOSIS — N18.9 CKD (CHRONIC KIDNEY DISEASE): ICD-10-CM

## 2020-02-24 PROBLEM — E07.9 DISEASE OF THYROID GLAND: Status: ACTIVE | Noted: 2020-02-24

## 2020-02-24 LAB
ERYTHROCYTE [DISTWIDTH] IN BLOOD BY AUTOMATED COUNT: 12.1 % (ref 11.6–15.1)
HCT VFR BLD AUTO: 34.2 % (ref 34.8–46.1)
HGB BLD-MCNC: 11.4 G/DL (ref 11.5–15.4)
MCH RBC QN AUTO: 31.8 PG (ref 26.8–34.3)
MCHC RBC AUTO-ENTMCNC: 33.3 G/DL (ref 31.4–37.4)
MCV RBC AUTO: 95 FL (ref 82–98)
PLATELET # BLD AUTO: 203 THOUSANDS/UL (ref 149–390)
PMV BLD AUTO: 10.1 FL (ref 8.9–12.7)
RBC # BLD AUTO: 3.59 MILLION/UL (ref 3.81–5.12)
WBC # BLD AUTO: 7.44 THOUSAND/UL (ref 4.31–10.16)

## 2020-02-24 PROCEDURE — 73564 X-RAY EXAM KNEE 4 OR MORE: CPT

## 2020-02-24 PROCEDURE — 72125 CT NECK SPINE W/O DYE: CPT

## 2020-02-24 PROCEDURE — 99285 EMERGENCY DEPT VISIT HI MDM: CPT

## 2020-02-24 PROCEDURE — 70450 CT HEAD/BRAIN W/O DYE: CPT

## 2020-02-24 PROCEDURE — 85027 COMPLETE CBC AUTOMATED: CPT | Performed by: PHYSICIAN ASSISTANT

## 2020-02-24 PROCEDURE — 99220 PR INITIAL OBSERVATION CARE/DAY 70 MINUTES: CPT | Performed by: PHYSICIAN ASSISTANT

## 2020-02-24 PROCEDURE — 36415 COLL VENOUS BLD VENIPUNCTURE: CPT | Performed by: PHYSICIAN ASSISTANT

## 2020-02-24 PROCEDURE — 99285 EMERGENCY DEPT VISIT HI MDM: CPT | Performed by: EMERGENCY MEDICINE

## 2020-02-24 RX ORDER — ONDANSETRON 2 MG/ML
4 INJECTION INTRAMUSCULAR; INTRAVENOUS EVERY 6 HOURS PRN
Status: DISCONTINUED | OUTPATIENT
Start: 2020-02-24 | End: 2020-02-27 | Stop reason: HOSPADM

## 2020-02-24 RX ORDER — MUSCLE RUB CREAM 100; 150 MG/G; MG/G
CREAM TOPICAL 4 TIMES DAILY PRN
Status: DISCONTINUED | OUTPATIENT
Start: 2020-02-24 | End: 2020-02-27 | Stop reason: HOSPADM

## 2020-02-24 RX ORDER — ATORVASTATIN CALCIUM 20 MG/1
20 TABLET, FILM COATED ORAL
Status: DISCONTINUED | OUTPATIENT
Start: 2020-02-25 | End: 2020-02-27 | Stop reason: HOSPADM

## 2020-02-24 RX ORDER — FERROUS SULFATE 325(65) MG
325 TABLET ORAL
Status: DISCONTINUED | OUTPATIENT
Start: 2020-02-24 | End: 2020-02-27 | Stop reason: HOSPADM

## 2020-02-24 RX ORDER — DULOXETIN HYDROCHLORIDE 30 MG/1
30 CAPSULE, DELAYED RELEASE ORAL DAILY
Status: DISCONTINUED | OUTPATIENT
Start: 2020-02-25 | End: 2020-02-27 | Stop reason: HOSPADM

## 2020-02-24 RX ORDER — CALCIUM CARBONATE 200(500)MG
1000 TABLET,CHEWABLE ORAL DAILY PRN
Status: DISCONTINUED | OUTPATIENT
Start: 2020-02-24 | End: 2020-02-27 | Stop reason: HOSPADM

## 2020-02-24 RX ORDER — ASPIRIN 81 MG/1
81 TABLET, CHEWABLE ORAL DAILY
Status: DISCONTINUED | OUTPATIENT
Start: 2020-02-25 | End: 2020-02-27 | Stop reason: HOSPADM

## 2020-02-24 RX ORDER — HEPARIN SODIUM 5000 [USP'U]/ML
5000 INJECTION, SOLUTION INTRAVENOUS; SUBCUTANEOUS EVERY 8 HOURS SCHEDULED
Status: DISCONTINUED | OUTPATIENT
Start: 2020-02-24 | End: 2020-02-27 | Stop reason: HOSPADM

## 2020-02-24 RX ORDER — ACETAMINOPHEN 325 MG/1
650 TABLET ORAL EVERY 6 HOURS PRN
Status: DISCONTINUED | OUTPATIENT
Start: 2020-02-24 | End: 2020-02-25

## 2020-02-24 RX ORDER — ACETAMINOPHEN 160 MG/5ML
650 SUSPENSION, ORAL (FINAL DOSE FORM) ORAL ONCE
Status: COMPLETED | OUTPATIENT
Start: 2020-02-24 | End: 2020-02-24

## 2020-02-24 RX ORDER — PANTOPRAZOLE SODIUM 20 MG/1
20 TABLET, DELAYED RELEASE ORAL
Status: DISCONTINUED | OUTPATIENT
Start: 2020-02-25 | End: 2020-02-27 | Stop reason: HOSPADM

## 2020-02-24 RX ORDER — CARVEDILOL 3.12 MG/1
3.12 TABLET ORAL 2 TIMES DAILY WITH MEALS
Status: DISCONTINUED | OUTPATIENT
Start: 2020-02-25 | End: 2020-02-27 | Stop reason: HOSPADM

## 2020-02-24 RX ORDER — CALCITRIOL 0.25 UG/1
0.25 CAPSULE, LIQUID FILLED ORAL 3 TIMES WEEKLY
Status: DISCONTINUED | OUTPATIENT
Start: 2020-02-24 | End: 2020-02-27 | Stop reason: HOSPADM

## 2020-02-24 RX ORDER — FUROSEMIDE 10 MG/ML
10 SOLUTION ORAL DAILY
COMMUNITY

## 2020-02-24 RX ORDER — LEVOTHYROXINE SODIUM 0.05 MG/1
50 TABLET ORAL
Status: DISCONTINUED | OUTPATIENT
Start: 2020-02-25 | End: 2020-02-27 | Stop reason: HOSPADM

## 2020-02-24 RX ORDER — FUROSEMIDE 20 MG/1
10 TABLET ORAL DAILY
Status: DISCONTINUED | OUTPATIENT
Start: 2020-02-25 | End: 2020-02-27 | Stop reason: HOSPADM

## 2020-02-24 RX ADMIN — ACETAMINOPHEN 650 MG: 650 SUSPENSION ORAL at 20:51

## 2020-02-25 PROBLEM — I25.5 ISCHEMIC CARDIOMYOPATHY: Status: ACTIVE | Noted: 2020-02-25

## 2020-02-25 PROBLEM — R62.7 FAILURE TO THRIVE IN ADULT: Status: ACTIVE | Noted: 2020-02-25

## 2020-02-25 LAB
ANION GAP SERPL CALCULATED.3IONS-SCNC: 7 MMOL/L (ref 4–13)
BUN SERPL-MCNC: 22 MG/DL (ref 5–25)
CALCIUM SERPL-MCNC: 9.6 MG/DL (ref 8.3–10.1)
CHLORIDE SERPL-SCNC: 101 MMOL/L (ref 100–108)
CO2 SERPL-SCNC: 30 MMOL/L (ref 21–32)
CREAT SERPL-MCNC: 1.02 MG/DL (ref 0.6–1.3)
GFR SERPL CREATININE-BSD FRML MDRD: 50 ML/MIN/1.73SQ M
GLUCOSE SERPL-MCNC: 147 MG/DL (ref 65–140)
POTASSIUM SERPL-SCNC: 4 MMOL/L (ref 3.5–5.3)
SODIUM SERPL-SCNC: 138 MMOL/L (ref 136–145)

## 2020-02-25 PROCEDURE — 36415 COLL VENOUS BLD VENIPUNCTURE: CPT | Performed by: PHYSICIAN ASSISTANT

## 2020-02-25 PROCEDURE — 97163 PT EVAL HIGH COMPLEX 45 MIN: CPT

## 2020-02-25 PROCEDURE — 99225 PR SBSQ OBSERVATION CARE/DAY 25 MINUTES: CPT | Performed by: PHYSICIAN ASSISTANT

## 2020-02-25 PROCEDURE — 80048 BASIC METABOLIC PNL TOTAL CA: CPT | Performed by: PHYSICIAN ASSISTANT

## 2020-02-25 PROCEDURE — 97167 OT EVAL HIGH COMPLEX 60 MIN: CPT

## 2020-02-25 RX ORDER — ACETAMINOPHEN 325 MG/1
650 TABLET ORAL EVERY 6 HOURS PRN
Status: DISCONTINUED | OUTPATIENT
Start: 2020-02-25 | End: 2020-02-25

## 2020-02-25 RX ORDER — ACETAMINOPHEN 325 MG/1
975 TABLET ORAL EVERY 8 HOURS SCHEDULED
Status: DISCONTINUED | OUTPATIENT
Start: 2020-02-25 | End: 2020-02-27 | Stop reason: HOSPADM

## 2020-02-25 RX ADMIN — HEPARIN SODIUM 5000 UNITS: 5000 INJECTION INTRAVENOUS; SUBCUTANEOUS at 00:52

## 2020-02-25 RX ADMIN — CARVEDILOL 3.12 MG: 3.12 TABLET, FILM COATED ORAL at 17:33

## 2020-02-25 RX ADMIN — MAGNESIUM GLUCONATE 500 MG ORAL TABLET 400 MG: 500 TABLET ORAL at 08:21

## 2020-02-25 RX ADMIN — ACETAMINOPHEN 975 MG: 325 TABLET ORAL at 13:24

## 2020-02-25 RX ADMIN — CALCITRIOL CAPSULES 0.25 MCG 0.25 MCG: 0.25 CAPSULE ORAL at 01:53

## 2020-02-25 RX ADMIN — DULOXETINE HYDROCHLORIDE 30 MG: 30 CAPSULE, DELAYED RELEASE ORAL at 08:21

## 2020-02-25 RX ADMIN — FERROUS SULFATE TAB 325 MG (65 MG ELEMENTAL FE) 325 MG: 325 (65 FE) TAB at 00:52

## 2020-02-25 RX ADMIN — HEPARIN SODIUM 5000 UNITS: 5000 INJECTION INTRAVENOUS; SUBCUTANEOUS at 13:24

## 2020-02-25 RX ADMIN — DEXTRAN 70 AND HYPROMELLOSE 2910 1 DROP: 1; 3 SOLUTION/ DROPS OPHTHALMIC at 01:52

## 2020-02-25 RX ADMIN — CARVEDILOL 3.12 MG: 3.12 TABLET, FILM COATED ORAL at 08:20

## 2020-02-25 RX ADMIN — MENTHOL, METHYL SALICYLATE: 10; 15 CREAM TOPICAL at 08:31

## 2020-02-25 RX ADMIN — DEXTRAN 70 AND HYPROMELLOSE 2910 1 DROP: 1; 3 SOLUTION/ DROPS OPHTHALMIC at 21:37

## 2020-02-25 RX ADMIN — ASPIRIN 81 MG 81 MG: 81 TABLET ORAL at 08:21

## 2020-02-25 RX ADMIN — ATORVASTATIN CALCIUM 20 MG: 20 TABLET, FILM COATED ORAL at 17:33

## 2020-02-25 RX ADMIN — DEXTRAN 70 AND HYPROMELLOSE 2910 1 DROP: 1; 3 SOLUTION/ DROPS OPHTHALMIC at 08:21

## 2020-02-25 RX ADMIN — FERROUS SULFATE TAB 325 MG (65 MG ELEMENTAL FE) 325 MG: 325 (65 FE) TAB at 21:32

## 2020-02-25 RX ADMIN — ACETAMINOPHEN 975 MG: 325 TABLET ORAL at 21:32

## 2020-02-25 RX ADMIN — HEPARIN SODIUM 5000 UNITS: 5000 INJECTION INTRAVENOUS; SUBCUTANEOUS at 21:32

## 2020-02-25 RX ADMIN — HEPARIN SODIUM 5000 UNITS: 5000 INJECTION INTRAVENOUS; SUBCUTANEOUS at 05:33

## 2020-02-25 RX ADMIN — FUROSEMIDE 10 MG: 20 TABLET ORAL at 08:20

## 2020-02-25 RX ADMIN — PANTOPRAZOLE SODIUM 20 MG: 20 TABLET, DELAYED RELEASE ORAL at 05:32

## 2020-02-25 RX ADMIN — LEVOTHYROXINE SODIUM 50 MCG: 50 TABLET ORAL at 05:32

## 2020-02-25 NOTE — ED PROVIDER NOTES
History  Chief Complaint   Patient presents with    Knee Pain     Patient complains of right knee pain post fall yesterday  She is alert and oriented to baseline per family  Dementia hx  HPI   This is an 43-year-old woman who presents to the emergency department for right knee pain after fall yesterday  The patient has a history of severe dementia  She falls frequently  Typically ambulates using a walker and 2 person assist   She was momentarily on her own yesterday  She yelled out "help "  Family went back into the room and found her on the floor flat on her back  Does not appear that she lost consciousness  Patient was ambulating with her walker later that same day after they helped her to her feet  This morning she had trouble getting out of bed and walking and was complaining that her right knee was hurting  Family has not noticed any bruising or swelling of the knee  She did have a previous knee replacement in this leg  Takes 81 mg aspirin  Denies any headache or neck pain  No chest pain or shortness of breath  No abdominal pain, back pain, hip pain, pain in the lower extremities  Patient does have known congestive heart failure and a recent hospital admission for generalized weakness  Palliative care was consulted at that with consideration for hospice recommended  Patient is not currently enrolled in hospice, but per daughter they are not aggressively treating any of patient's chronic illnesses and are not interested in workup for possible non-mechanical causes for her worsening ambulatory dysfunction, frequent falls or weakness  Prior to Admission Medications   Prescriptions Last Dose Informant Patient Reported? Taking?    DULoxetine (CYMBALTA) 30 mg delayed release capsule   Yes Yes   Sig: Take 30 mg by mouth daily   aspirin 81 mg chewable tablet   Yes Yes   Sig: Chew 81 mg daily   atorvastatin (LIPITOR) 20 mg tablet   Yes Yes   Sig: Take 20 mg by mouth daily   calcitriol (ROCALTROL) 0 25 mcg capsule   Yes Yes   Sig: Take 0 25 mcg by mouth 3 (three) times a week   carvedilol (COREG) 3 125 mg tablet   Yes Yes   Sig: Take 3 125 mg by mouth 2 (two) times a day with meals   cycloSPORINE (RESTASIS) 0 05 % ophthalmic emulsion   Yes Yes   Si drop 2 (two) times a day   ferrous sulfate 325 (65 Fe) mg tablet   Yes Yes   Sig: Take 325 mg by mouth daily at bedtime   furosemide (LASIX) 10 mg/mL oral solution   Yes Yes   Sig: Take 10 mg by mouth daily   levothyroxine 50 mcg tablet   Yes Yes   Sig: Take 50 mcg by mouth daily   magnesium oxide (MAG-OX) 400 mg   Yes Yes   Sig: Take 400 mg by mouth daily after breakfast   omeprazole (PriLOSEC) 20 mg delayed release capsule   Yes Yes   Sig: Take 20 mg by mouth every other day      Facility-Administered Medications: None       Past Medical History:   Diagnosis Date    [facility])     Dementia ([facility])     Depression     Diabetes mellitus ([facility] [address] )     Disease of thyroid gland     Kidney disease, chronic, stage III (moderate, EGFR 30-59 ml/min) ([facility])     Pacemaker     TIA (transient ischemic attack)        Past Surgical History:   Procedure Laterality Date    REPLACEMENT TOTAL KNEE Right     RIB FRACTURE SURGERY  2018, 10/29/2019       History reviewed  No pertinent family history  I have reviewed and agree with the history as documented  E-Cigarette/Vaping     E-Cigarette/Vaping Substances     Social History     Tobacco Use    Smoking status: Never Smoker    Smokeless tobacco: Never Used   Substance Use Topics    Alcohol use: Never     Frequency: Never    Drug use: Never         Review of Systems   Unable to perform ROS: Dementia   Constitutional: Negative for chills and fever  Respiratory: Negative for shortness of breath  Cardiovascular: Negative for chest pain  Gastrointestinal: Negative for abdominal pain, nausea and vomiting  Musculoskeletal: Positive for arthralgias and gait problem   Negative for back pain, joint swelling and neck pain  Neurological: Negative for headaches  All other systems reviewed and are negative  Physical Exam  ED Triage Vitals [02/24/20 1948]   Temperature Pulse Respirations Blood Pressure SpO2   97 7 °F (36 5 °C) 76 16 (!) 202/84 99 %      Temp Source Heart Rate Source Patient Position - Orthostatic VS BP Location FiO2 (%)   Oral Monitor Lying Left arm --      Pain Score       5             Orthostatic Vital Signs  Vitals:    02/24/20 2024 02/24/20 2231 02/25/20 0018 02/25/20 0032   BP: (!) 190/85 163/91 170/80 (!) 176/85   Pulse: 72 78 64 70   Patient Position - Orthostatic VS:  Lying Lying        Physical Exam   Constitutional: She appears well-developed and well-nourished  No distress  HENT:   Head: Normocephalic  Small bruise over the occiput  Eyes: Pupils are equal, round, and reactive to light  Conjunctivae and EOM are normal  No scleral icterus  Neck: Normal range of motion  Neck supple  No cervical spine tenderness to palpation  Cardiovascular: Normal rate and regular rhythm  Exam reveals no gallop and no friction rub  No murmur heard  Pulmonary/Chest: Breath sounds normal  She has no wheezes  She has no rales  No bruising or tenderness to palpation over the chest wall  Abdominal: Soft  She exhibits no distension  There is no tenderness  There is no rebound and no guarding  No tenderness to palpation or bruising over the abdomen  Musculoskeletal: Normal range of motion  She exhibits tenderness  She exhibits no edema  Tenderness to palpation over the anterior aspect of the right knee  The leg is held with the knee in flexion  Pain with extension of the knee  No tenderness over the thoracic or lumbar spine  No tenderness to palpation over the hips or lower extremities  Neurological: She is alert  No cranial nerve deficit or sensory deficit  She exhibits normal muscle tone  Skin: Skin is warm and dry  She is not diaphoretic  No erythema  No pallor  Psychiatric: She has a normal mood and affect  Her behavior is normal    Dementia  Nursing note and vitals reviewed        ED Medications  Medications   ondansetron (ZOFRAN) injection 4 mg (has no administration in time range)   calcium carbonate (TUMS) chewable tablet 1,000 mg (has no administration in time range)   heparin (porcine) subcutaneous injection 5,000 Units (5,000 Units Subcutaneous Given 2/25/20 0052)   menthol-methyl salicylate (BENGAY) 47-31 % cream (has no administration in time range)   aspirin chewable tablet 81 mg (has no administration in time range)   atorvastatin (LIPITOR) tablet 20 mg (has no administration in time range)   calcitriol (ROCALTROL) capsule 0 25 mcg (has no administration in time range)   carvedilol (COREG) tablet 3 125 mg (has no administration in time range)   dextran 70-hypromellose (GENTEAL TEARS) 0 1-0 3 % ophthalmic solution 1 drop (has no administration in time range)   DULoxetine (CYMBALTA) delayed release capsule 30 mg (has no administration in time range)   ferrous sulfate tablet 325 mg (325 mg Oral Given 2/25/20 0052)   furosemide (LASIX) tablet 10 mg (has no administration in time range)   levothyroxine tablet 50 mcg (has no administration in time range)   magnesium oxide (MAG-OX) tablet 400 mg (has no administration in time range)   pantoprazole (PROTONIX) EC tablet 20 mg (has no administration in time range)   morphine injection 2 mg (has no administration in time range)   acetaminophen (TYLENOL) tablet 650 mg (has no administration in time range)   acetaminophen (TYLENOL) oral suspension 650 mg (650 mg Oral Given 2/24/20 2051)       Diagnostic Studies  Results Reviewed     Procedure Component Value Units Date/Time    Basic metabolic panel [[id number]]  (Abnormal) Collected:  02/25/20 0016    Lab Status:  Final result Specimen:  Blood from Hand, Left Updated:  02/25/20 0045     Sodium 138 mmol/L      Potassium 4 0 mmol/L      Chloride 101 mmol/L CO2 30 mmol/L      ANION GAP 7 mmol/L      BUN 22 mg/dL      Creatinine 1 02 mg/dL      Glucose 147 mg/dL      Calcium 9 6 mg/dL      eGFR 50 ml/min/1 73sq m     Narrative:       [place] guidelines for Chronic Kidney Disease (CKD):     Stage 1 with normal or high GFR (GFR > 90 mL/min/1 73 square meters)    Stage 2 Mild CKD (GFR = 60-89 mL/min/1 73 square meters)    Stage 3A Moderate CKD (GFR = 45-59 mL/min/1 73 square meters)    Stage 3B Moderate CKD (GFR = 30-44 mL/min/1 73 square meters)    Stage 4 Severe CKD (GFR = 15-29 mL/min/1 73 square meters)    Stage 5 End Stage CKD (GFR <15 mL/min/1 73 square meters)  Note: GFR calculation is accurate only with a steady state creatinine    CBC and Platelet [[id number]]  (Abnormal) Collected:  02/24/20 2312    Lab Status:  Final result Specimen:  Blood from Arm, Right Updated:  02/24/20 2330     WBC 7 44 Thousand/uL      RBC 3 59 Million/uL      Hemoglobin 11 4 g/dL      Hematocrit 34 2 %      MCV 95 fL      MCH 31 8 pg      MCHC 33 3 g/dL      RDW 12 1 %      Platelets 904 Thousands/uL      MPV 10 1 fL     Platelet count [[id number]]     Lab Status:  No result Specimen:  Blood                  CT head without contrast   Final Result by Mukul Kelly MD (02/24 2201)         1  No acute intracranial hemorrhage, mass effect or extra-axial collection  2   Posterior parietal occipital scalp hematoma  No acute calvarial fracture  Workstation performed: [id number]         CT spine cervical without contrast   Final Result by Mukul Kelly MD (02/24 2157)      No acute cervical spine fracture or traumatic malalignment  Workstation performed: [id number]         XR knee 4+ vw right injury   ED Interpretation by Apurva Morelos MD (02/24 2051)   ED wet read: No fracture              Procedures  Procedures      ED Course         MDM  Number of Diagnoses or Management Options  Acute pain of right knee: new and requires workup  Ambulatory dysfunction: established and worsening  Dementia ([facility] [address] ): established and worsening  Fall from standing, initial encounter: new and requires workup     Amount and/or Complexity of Data Reviewed  Clinical lab tests: ordered and reviewed  Tests in the radiology section of CPT®: ordered and reviewed  Tests in the medicine section of CPT®: ordered and reviewed  Decide to obtain previous medical records or to obtain history from someone other than the patient: yes  Obtain history from someone other than the patient: yes  Review and summarize past medical records: yes  Independent visualization of images, tracings, or specimens: yes    Patient Progress  Patient progress: stable    80year-old woman here for a knee pain after a fall  Plan is x-ray of the right knee, CT head and neck  Per daughter who is the patient's POA, no medical workup for her other chronic illnesses today  Focus will be to identify any traumatic injuries and treating pain as appropriate  CT head and neck negative for clinically significant traumatic injuries  X-ray of her knee is negative for any fractures or hardware abnormalities by my read  Patient was given Tylenol and attempted to stand at bedside, but she is not even able to place indirect weight on the right lower extremity  Patient's daughter says that she has multiple sclerosis and she and her  are not able to take care of patient at home if she is not even able to stand independently  They are amenable to patient being temporarily in inpatient rehab and/or skilled nursing facility  Discussed with Dr Jennifer Ruano for admission       Disposition  Final diagnoses:   Acute pain of right knee   Fall from standing, initial encounter   Ambulatory dysfunction   Dementia [facility])     Time reflects when diagnosis was documented in both MDM as applicable and the Disposition within this note     Time User Action Codes Description Comment    2/24/2020 10:57 PM Isabel Wolff Add [M25 561] Acute pain of right knee     2/24/2020 10:57 PM Isabel Wolff Add [T62  XXXA] Fall from standing, initial encounter     2/24/2020 10:57 PM Isabel Wolff Add [R26 2] Ambulatory dysfunction     2/24/2020 10:58 PM Isabel Wolff Add [F03 90] Dementia [facility])       ED Disposition     ED Disposition Condition Date/Time Comment    Admit Fair Mon Feb 24, 2020 10:58 PM Case was discussed with Dr Ze Newton, and the patient's admission status was agreed to be Admission Status: observation status to the service of Dr Ze Newton  Follow-up Information    None         Current Discharge Medication List      CONTINUE these medications which have NOT CHANGED    Details   aspirin 81 mg chewable tablet Chew 81 mg daily      atorvastatin (LIPITOR) 20 mg tablet Take 20 mg by mouth daily      calcitriol (ROCALTROL) 0 25 mcg capsule Take 0 25 mcg by mouth 3 (three) times a week      carvedilol (COREG) 3 125 mg tablet Take 3 125 mg by mouth 2 (two) times a day with meals      cycloSPORINE (RESTASIS) 0 05 % ophthalmic emulsion 1 drop 2 (two) times a day      DULoxetine (CYMBALTA) 30 mg delayed release capsule Take 30 mg by mouth daily      ferrous sulfate 325 (65 Fe) mg tablet Take 325 mg by mouth daily at bedtime      furosemide (LASIX) 10 mg/mL oral solution Take 10 mg by mouth daily      levothyroxine 50 mcg tablet Take 50 mcg by mouth daily      magnesium oxide (MAG-OX) 400 mg Take 400 mg by mouth daily after breakfast      omeprazole (PriLOSEC) 20 mg delayed release capsule Take 20 mg by mouth every other day           No discharge procedures on file  PDMP Review     None           ED Provider  Attending physically available and evaluated Adi Butler I managed the patient along with the ED Attending      Electronically Signed by         Paulina Long MD  02/25/20 5358

## 2020-02-25 NOTE — ASSESSMENT & PLAN NOTE
Admit to med/surg  Patient with frequent falls at home  Recent palliative care evaluation last month   Consult placed to hospice for eval  Knee xrays negative for acute fracture or dislocation  Avoid narcotics in setting of dementia and advanced age  Consult PT/OT

## 2020-02-25 NOTE — H&P
H&P- Steve Lanza 1933, 80 y o  female MRN: [id number]    Unit/Bed#: ED 18 Encounter: [id number]    Primary Care Provider: Adriana Abbott DO   Date and time admitted to hospital: 2/24/2020  7:46 PM        * Knee pain  Assessment & Plan  Admit to med/surg  Patient with frequent falls at home  Recent palliative care evaluation last month  Consult placed to hospice for eval  Knee xrays negative for acute fracture or dislocation  Avoid narcotics in setting of dementia and advanced age  Consult PT/OT    Disease of thyroid gland  Assessment & Plan  Continue levothyroxine    Dementia ([facility] [address] )  Assessment & Plan  Baseline per family    HTN (hypertension)  Assessment & Plan  Continue home meds    CKD (chronic kidney disease)  Assessment & Plan  Awaiting labs     CHF (congestive heart failure) ([facility])  Assessment & Plan  Wt Readings from Last 3 Encounters:   02/24/20 57 8 kg (127 lb 6 8 oz)   12/11/14 71 2 kg (157 lb)   11/21/14 70 3 kg (155 lb)     Appears euvolemic  Continue home lasix dose  Last ECHO showed ECHO of 20-25%            VTE Prophylaxis: Heparin  / sequential compression device   Code Status: DNR/DNI  POLST: There is no POLST form on file for this patient (pre-hospital)  Discussion with family: daughter at bedside    Anticipated Length of Stay:  Patient will be admitted on an Observation basis with an anticipated length of stay of  Less than 2 midnights  Justification for Hospital Stay: patient requires PT/OT consult     Total Time for Visit, including Counseling / Coordination of Care: 45 minutes  Greater than 50% of this total time spent on direct patient counseling and coordination of care  Chief Complaint:   Knee pain    History of Present Illness:    Steve Lanza is a 80 y o  female who presents with right knee pain s/p fall at home  Patient lives with daughter  Patient was recently admitted with failure to thrive and was evaluated by palliative care  She is DNR/DNI   She had an unwitnessed fall yesterday  She was found flat on her back  She had a prior TKR in this knee        Review of Systems:    Review of Systems   Unable to perform ROS: Dementia       Past Medical and Surgical History:     Past Medical History:   Diagnosis Date    [facility]est[facility]) ([facility] )     Dementia ([facility] )     Depression     Diabetes mellitus ([facility] )     Disease of thyroid gland     Kidney disease, chronic, stage III (moderate, EGFR 30-59 ml/min) ([facility] )     Pacemaker     TIA (transient ischemic attack)        Past Surgical History:   Procedure Laterality Date    REPLACEMENT TOTAL KNEE Right     RIB FRACTURE SURGERY  07/2018, 10/29/2019       Meds/Allergies:    Prior to Admission medications    Medication Sig Start Date End Date Taking?  Authorizing Provider   aspirin 81 mg chewable tablet Chew 81 mg daily   Yes Historical Provider, MD   atorvastatin (LIPITOR) 20 mg tablet Take 20 mg by mouth daily   Yes Historical Provider, MD   calcitriol (ROCALTROL) 0 25 mcg capsule Take 0 25 mcg by mouth 3 (three) times a week   Yes Historical Provider, MD   carvedilol (COREG) 3 125 mg tablet Take 3 125 mg by mouth 2 (two) times a day with meals   Yes Historical Provider, MD   cycloSPORINE (RESTASIS) 0 05 % ophthalmic emulsion 1 drop 2 (two) times a day   Yes Historical Provider, MD   DULoxetine (CYMBALTA) 30 mg delayed release capsule Take 30 mg by mouth daily   Yes Historical Provider, MD   ferrous sulfate 325 (65 Fe) mg tablet Take 325 mg by mouth daily at bedtime   Yes Historical Provider, MD   furosemide (LASIX) 10 mg/mL oral solution Take 10 mg by mouth daily   Yes Historical Provider, MD   levothyroxine 50 mcg tablet Take 50 mcg by mouth daily   Yes Historical Provider, MD   magnesium oxide (MAG-OX) 400 mg Take 400 mg by mouth daily after breakfast   Yes Historical Provider, MD   omeprazole (PriLOSEC) 20 mg delayed release capsule Take 20 mg by mouth every other day   Yes Historical Provider, MD     I have reviewed home medications with patient family member  Allergies: Allergies   Allergen Reactions    Ace Inhibitors     Cefuroxime Diarrhea    Oxycodone     Oxycodone-Acetaminophen Other (See Comments)     makes me feel like another person    Penicillin G Diarrhea       Social History:     Marital Status:    Occupation: retired  Patient Pre-hospital Living Situation: lives with family  Patient Pre-hospital Level of Mobility: ambulates with walker  Patient Pre-hospital Diet Restrictions: none  Substance Use History:   Social History     Substance and Sexual Activity   Alcohol Use Never    Frequency: Never     Social History     Tobacco Use   Smoking Status Never Smoker   Smokeless Tobacco Never Used     Social History     Substance and Sexual Activity   Drug Use Never       Family History:    non-contributory    Physical Exam:     Vitals:   Blood Pressure: 163/91 (02/24/20 2231)  Pulse: 78 (02/24/20 2231)  Temperature: 97 6 °F (36 4 °C) (02/24/20 2231)  Temp Source: Oral (02/24/20 2231)  Respirations: 16 (02/24/20 2231)  Height: 5' 6" (167 6 cm) (02/24/20 1948)  Weight - Scale: 57 8 kg (127 lb 6 8 oz) (02/24/20 1948)  SpO2: 96 % (02/24/20 2231)    Physical Exam   Constitutional:   Frail appearing   HENT:   Head: Normocephalic and atraumatic  Eyes: Pupils are equal, round, and reactive to light  EOM are normal    Neck: Normal range of motion  Neck supple  Cardiovascular: Normal rate and regular rhythm  Pulmonary/Chest: Effort normal and breath sounds normal    Abdominal: Soft  Bowel sounds are normal    Musculoskeletal: Normal range of motion  She exhibits no edema or deformity  Well healed surgical incision  Generalized right knee pain  Neurological: She is alert  Pleasantly confused   Skin: Skin is warm and dry  Psychiatric: She has a normal mood and affect  Her behavior is normal            Additional Data:     Lab Results: I have personally reviewed pertinent reports  Imaging: I have personally reviewed pertinent reports  CT head without contrast   Final Result by Robert Walsh MD (02/24 2201)         1  No acute intracranial hemorrhage, mass effect or extra-axial collection  2   Posterior parietal occipital scalp hematoma  No acute calvarial fracture  Workstation performed: [id number]         CT spine cervical without contrast   Final Result by Robert Walsh MD (02/24 2157)      No acute cervical spine fracture or traumatic malalignment  Workstation performed: [id number]         XR knee 4+ vw right injury   ED Interpretation by Tania Bauman MD (02/24 2051)   ED wet read: No fracture  EKG, Pathology, and Other Studies Reviewed on Admission:   · EKG: none    Allscripts / Epic Records Reviewed: Yes     ** Please Note: This note has been constructed using a voice recognition system   **

## 2020-02-25 NOTE — PLAN OF CARE
Problem: OCCUPATIONAL THERAPY ADULT  Goal: Performs self-care activities at highest level of function for planned discharge setting  See evaluation for individualized goals  Description  Treatment Interventions: ADL retraining, Functional transfer training, UE strengthening/ROM, Endurance training, Cognitive reorientation, Patient/family training, Equipment evaluation/education, Compensatory technique education, Continued evaluation          See flowsheet documentation for full assessment, interventions and recommendations  Note:   Limitation: Decreased ADL status, Decreased UE strength, Decreased Safe judgement during ADL, Decreased cognition, Decreased endurance, Decreased high-level ADLs  Prognosis: Fair  Assessment: Pt is a 85y/o female admitted to the hospital after having an unwitnessed fall at home; pt noted with R knee pain--x-ray/CT(C-spine, head, R knee)=neg acute findings  Pt with PMH failure to thrive, dementia, R TKR, DM, CKD, TIA  Per last admission's notes(1/29/20), pt had assistance with her ADLs; independence with transfers/ambulation--with RW; +falls  During initial eval, pt demonstrated deficits with her functional balance, functional mobility, ADL status, transfer safety, b/l UE strength, activity tolerance(currently fair=15-20mins), and cognition(i e orientation, memory, problem-solving, judgement/safety)  If pt is able to demonstrate carryover with her tx intervention, pt would benefit from continued OT tx for the above deficits  3-5xwk/1-2wks        OT Discharge Recommendation: 24 hour supervision/assist

## 2020-02-25 NOTE — ASSESSMENT & PLAN NOTE
Wt Readings from Last 3 Encounters:   02/24/20 57 8 kg (127 lb 6 8 oz)   12/11/14 71 2 kg (157 lb)   11/21/14 70 3 kg (155 lb)     Appears euvolemic  Continue home lasix dose  Last ECHO showed ECHO of 20-25%

## 2020-02-25 NOTE — ASSESSMENT & PLAN NOTE
Wt Readings from Last 3 Encounters:   02/24/20 57 8 kg (127 lb 6 8 oz)   12/11/14 71 2 kg (157 lb)   11/21/14 70 3 kg (155 lb)     Appears euvolemic  Continue home lasix   Last ECHO showed ECHO of 20-25%

## 2020-02-25 NOTE — ASSESSMENT & PLAN NOTE
Xray without acute fracture or acute osseous abnormality   Supportive care   Recent palliative care evaluation last month   Consult placed to hospice for eval  Avoid narcotics in setting of dementia and advanced age

## 2020-02-25 NOTE — ED ATTENDING ATTESTATION
2/24/2020  I, Aurora Flood MD, saw and evaluated the patient  I have discussed the patient with the resident/non-physician practitioner and agree with the resident's/non-physician practitioner's findings, Plan of Care, and MDM as documented in the resident's/non-physician practitioner's note, except where noted  All available labs and Radiology studies were reviewed  I was present for key portions of any procedure(s) performed by the resident/non-physician practitioner and I was immediately available to provide assistance  At this point I agree with the current assessment done in the Emergency Department  I have conducted an independent evaluation of this patient a history and physical is as follows:    ED Course         Critical Care Time  Procedures     Pt  Lives at home with her family  She's had frequent falls and ambulates with a walker  She had an unwitnessed fall yest  - today she had trouble getting out of bed because of R knee pain  On aspirin  No headaches  +h/o dementia  On exam, bruise on occiput, no cspine tenderness, RRR, CTA b/l, abd  -nontender, R knee anterior tenderness, no redness or open wounds , +mild swelling, +n/v intact, decreased ROM secondary to pain  Family is trying to keep her at home  They had hospice evaluate her in the past   Her daughter is her POA and doesn't want any extreme measures done  She doesn't want her to have bloodwork or an iv  She knows that this is subopitmal treatment  Her goal is to take her home and re-evaluate for hospice at some point  Pt  Stated later to us that she cannot take care of her in this condition when she cannot bear weight on the RLE    hospitalist admitted pt

## 2020-02-25 NOTE — PHYSICAL THERAPY NOTE
PHYSICAL THERAPY EVALUATION      Patient Name: Lucio Cruz  EGOPJ'M Date: 2/25/2020   PT EVALUATION    80 y o     [id number]    Knee pain [M25 569]  Dementia ([facility] ) [F03 90]  Ambulatory dysfunction [R26 2]  Acute pain of right knee [M25 561]    Past Medical History:   Diagnosis Date    [facility]) [facility])     Dementia ([facility] )     Depression     Diabetes mellitus ([place] [address] )     Disease of thyroid gland     Kidney disease, chronic, stage III (moderate, EGFR 30-59 ml/min) ([facility])     Pacemaker     TIA (transient ischemic attack)      Past Surgical History:   Procedure Laterality Date    REPLACEMENT TOTAL KNEE Right     RIB FRACTURE SURGERY  07/2018, 10/29/2019        02/25/20 0954   Note Type   Note type Eval only   Pain Assessment   Pain Assessment FLACC   Pain Location Knee   Pain Orientation Right   Pain Rating: FLACC (Rest) - Face 0   Pain Rating: FLACC (Rest) - Legs 0   Pain Rating: FLACC (Rest) - Activity 0   Pain Rating: FLACC (Rest) - Cry 0   Pain Rating: FLACC (Rest) - Consolability 0   Score: FLACC (Rest) 0   Pain Rating: FLACC (Activity) - Face 1   Pain Rating: FLACC (Activity) - Legs 1   Pain Rating: FLACC (Activity) - Activity 0   Pain Rating: FLACC (Activity) - Cry 1   Pain Rating: FLACC (Activity) - Consolability 0   Score: FLACC (Activity) 3   Home Living   Type of Home House   Home Layout Multi-level; Able to live on main level with bedroom/bathroom  (4 steps between levels)   Home Equipment Walker   Additional Comments unable to obtain prior home living given baseline cognitive deficits; obtained brief social hx via chart review   Prior Function   Level of [place] Needs assistance with ADLs and functional mobility; Needs assistance with IADLs   Lives With Daughter;Family  (MADELEINE)   Receives Help From Family   ADL Assistance Needs assistance   IADLs Needs assistance   Falls in the last 6 months 1 to 4  (2 at last admission, at least one since then)   Vocational Retired   Comments at time of last admission pt had HHA 2/wk, unable to identify what services pt currently has  per chart review, pt amb w RW w Ax2   Restrictions/Precautions   Weight Bearing Precautions Per Order No   Other Precautions Cognitive; Chair Alarm; Bed Alarm;Telemetry; Fall Risk;Pain   General   Additional Pertinent History pt admitted 2/24/20 for R knee pain  per chart review hx of dementia, falls  pt w recent admission to [facility] from 1/28-31/20, was max Ax2 at time of d/c home w support from family   Family/Caregiver Present No   Cognition   Overall Cognitive Status Impaired   Arousal/Participation Responsive   Orientation Level Oriented to person;Oriented to place; Disoriented to time;Disoriented to situation  (general to place- hospital)   Memory Decreased recall of precautions;Decreased recall of recent events;Decreased short term memory   Following Commands Follows one step commands with increased time or repetition   Comments pt w dementia at baseline   RUE Assessment   RUE Assessment   (refer to OT)   LUE Assessment   LUE Assessment   (refer to OT)   RLE Assessment   RLE Assessment X  (limited by pain, cognition)   Strength RLE   RLE Overall Strength 3-/5   LLE Assessment   LLE Assessment X  (limited by pain, cognition)   Strength LLE   LLE Overall Strength 3-/5   Coordination   Movements are Fluid and Coordinated 0   Coordination and Movement Description unstable, decreased fluidity of movement 2* to pain, cognition   Transfers   Sit to Stand 2  Maximal assistance   Additional items Assist x 2; Increased time required;Verbal cues; Other  (RW)   Stand to Sit 2  Maximal assistance   Additional items Assist x 2;Impulsive;Verbal cues; Other  (RW)   Additional Comments pt require max Ax2 for transf from bedside chair  poor use of UE support- require placement of bl hands on RW  noted poor foot placement- requiring blocking from therapists to prevent forward slide   pt unable to achieve full standing due to fear, cognition, weakness  unable to correct posture despite verbal and tactile cuing  Ambulation/Elevation   Gait pattern Not appropriate  (give zero standing balance)   Balance   Static Sitting Fair   Dynamic Sitting Fair -   Static Standing Poor -  (max Ax1 to maintain upright)   Dynamic Standing Poor -   Endurance Deficit   Endurance Deficit Yes   Endurance Deficit Description weakness, fatigue, pain   Activity Tolerance   Activity Tolerance Patient limited by fatigue;Patient limited by pain; Other (Comment)  (cognition)   Medical Staff Made Aware Leeroy OT   Nurse Made Aware Ruel Mccollum RN   Assessment   Prognosis Poor   Problem List Decreased strength;Decreased range of motion;Decreased endurance; Impaired balance;Decreased coordination;Decreased mobility; Decreased cognition; Impaired judgement;Decreased safety awareness;Pain   Assessment Breanna Marin is a 80 y o  female admitted to [facility] on 2/24/2020 for Failure to thrive in adult  Pt  has a past medical history of CHF (congestive heart failure) ([facility] ), Dementia ([facility] ), Depression, Diabetes mellitus ([facility] ), Disease of thyroid gland, Kidney disease, chronic, stage III (moderate, EGFR 30-59 ml/min) ([facility] ), Pacemaker, and TIA (transient ischemic attack)    PT was consulted and pt was seen on 2/25/2020 for mobility assessment and d/c planning  Pt presents high fall risk precautions, monitoring abn labs and vitals, PIV, continuous telemetry  Pt w prev admission Jan 28-31/20, pt was max Ax2 for transf and bed mobility at time of d/c, recommendation was for STR however pt d/c home w family support, ?services  Per chart review, pt was amb w RW at home and Ax2, hx of frequent falls  Pt lives with her dtr and MADELEINE, A at baseline  Unable to obtain comprehensive social hx given baseline dementia; brief hx obtained via chart review and familiarity of pt from last admission  Pt is currently functioning at a max Ax2 for transf   Pt with fear of falling, zero upright balance and unable to correct posture w cuing  Recommend renée for OOB mobility bed<>chair; nsg staff updated  Pt will benefit from continued skilled IP PT to address the above mentioned impairments  in order to maximize recovery and increase functional independence when completing mobility and ADLs  Currently PT recommendations for DME include renée for OOB mobility  At this time PT recommendations for d/c are STR vs LTC pending pt progress  End of session pt seated in bedside chair, chair alarm engaged and positioned w pillows to correct L lean and promote upright balance  Barriers to Discharge Inaccessible home environment;Decreased caregiver support   Barriers to Discharge Comments per chart review, dtr and MADELEINE are unable to care for pt if she is unable to stand indep   Goals   Patient Goals none stated 2* to cognition   STG Expiration Date 03/17/20   Short Term Goal #1 1)  Pt will perform bed mobility with mod Ax2 demonstrating appropriate technique 100% of the time in order to improve function and decrease caregiver function  2)  Perform all transfers with mod Ax2-max Ax1 demonstrating safe and appropriate technique 100% of the time in order to improve ability to negotiate safely in home environment  3) PT to see to assess amb when appropriate  4)  Improve overall strength and balance 1/2 grade in order to optimize ability to perform functional tasks and reduce fall risk  5) Increase activity tolerance to 25 minutes in order to improve endurance to functional tasks  6) PT for ongoing patient and family/caregiver education, DME needs and d/c planning in order to promote highest level of function in least restrictive environment  PT Treatment Day 0   Plan   Treatment/Interventions Functional transfer training;LE strengthening/ROM; Therapeutic exercise; Endurance training;Cognitive reorientation;Patient/family training;Equipment eval/education; Bed mobility;Continued evaluation;Spoke to nursing;OT   PT Frequency Other (Comment)  (3-5/wk)   Recommendation   Recommendation Short-term skilled PT;Long-term skilled nursing home placement  (STR vs LTC)   PT - OK to Discharge Yes   Additional Comments to STR when medically stable   Modified Jefferson Scale   Modified Javon Scale 4   Barthel Index   Feeding 5   Bathing 0   Grooming Score 0   Dressing Score 0   Bladder Score 5   Bowels Score 5   Toilet Use Score 5   Transfers (Bed/Chair) Score 5   Mobility (Level Surface) Score 0   Stairs Score 0   Barthel Index Score 25   History: co - morbidities, age, social background (need for 24/7 supervision; A at baseline from family), past experience (prev admission in Jan), fall risk, use of assistive device, assist for adl's, cognition (dementia, awareness, judgement, memory and safety), multiple lines  Exam: impairments in systems including musculoskeletal (ROM, strength, posture), neuromuscular (balance, transfers), joint integrity (xr R knee negative, CT neck negative), integumentary (skin integrity), cardiopulmonary (continuous telemetry), cognition  Clinical: unstable/unpredictable  Complexity:high      Gato Matson, PT

## 2020-02-25 NOTE — OCCUPATIONAL THERAPY NOTE
Occupational Therapy Evaluation(hqus=[id number])     Patient Name: Jerald Denis  QJYLB'N Date: 2/25/2020  Problem List  Principal Problem:    Failure to thrive in adult  Active Problems:    CHF (congestive heart failure) ([facility])    CKD (chronic kidney disease)    HTN (hypertension)    Dementia ([facility])    Knee pain    Disease of thyroid gland    Ischemic cardiomyopathy    Past Medical History  Past Medical History:   Diagnosis Date    CHF (congestive heart failure) ([facility])     Dementia [facility] )     Depression     Diabetes mellitus ([facility] )     Disease of thyroid gland     Kidney disease, chronic, stage III (moderate, EGFR 30-59 ml/min) ([facility])     Pacemaker     TIA (transient ischemic attack)      Past Surgical History  Past Surgical History:   Procedure Laterality Date    REPLACEMENT TOTAL KNEE Right     RIB FRACTURE SURGERY  07/2018, 10/29/2019             02/25/20 0959   Note Type   Note type Eval only   Restrictions/Precautions   Weight Bearing Precautions Per Order No   Other Precautions Fall Risk;Pain;Cognitive; Chair Alarm; Bed Alarm   Pain Assessment   Pain Assessment FLACC   Pain Rating: FLACC (Rest) - Face 0   Pain Rating: FLACC (Rest) - Legs 0   Pain Rating: FLACC (Rest) - Activity 0   Pain Rating: FLACC (Rest) - Cry 0   Pain Rating: FLACC (Rest) - Consolability 0   Score: FLACC (Rest) 0   Home Living   Type of Home House   Home Layout Two level   Home Equipment Walker   Prior Function   Lives With Daughter   ADL Assistance Needs assistance   Falls in the last 6 months 1 to 4   Lifestyle   Autonomy Per last admission's notes(1/29/20), pt had assistance with her ADLs; independence with transfers/ambulation--with RW; +falls   Reciprocal Relationships supportive dtr   Service to Others worked as a    Intrinsic Gratification watching TV   Psychosocial   Psychosocial (WDL) X   Patient Behaviors/Mood Flat affect; Cooperative   Subjective   Subjective "I don't know where I am "   ADL   Where Assessed Chair   Eating Assistance 5  Supervision/Setup   Grooming Assistance 4  Minimal Assistance   UB Bathing Assistance 2  Maximal Assistance   LB Bathing Assistance 2  Maximal [address] 2  Maximal [address] 2  Maximal Assistance   Transfers   Sit to Stand 2  Maximal assistance   Additional items Assist x 2; Increased time required;Verbal cues   Stand to Sit 2  Maximal assistance   Additional items Assist x 2; Increased time required;Verbal cues   Functional Mobility   Functional Mobility   (recommend hoyerlift for OOB with nsg)   Balance   Static Sitting Fair   Dynamic Sitting Fair -   Static Standing Poor -   Dynamic Standing Poor -   Activity Tolerance   Activity Tolerance Patient limited by fatigue   Medical Staff Made Aware nsg, P T     RUE Assessment   RUE Assessment WFL   RUE Strength   RUE Overall Strength Within Functional Limits - able to perform ADL tasks with strength  (3+/5 throughout)   LUE Assessment   LUE Assessment WFL   LUE Strength   LUE Overall Strength Within Functional Limits - able to perform ADL tasks with strength  (3+/5 throughout)   Hand Function   Gross Motor Coordination Functional   Fine Motor Coordination Functional   Sensation   Light Touch No apparent deficits   Proprioception   Proprioception No apparent deficits   Vision-Basic Assessment   Current Vision   (glasses)   Vision - Complex Assessment   Acuity Able to read clock/calendar on wall without difficulty   Perception   Inattention/Neglect Cues to attend to left side of body   Cognition   Overall Cognitive Status Impaired   Arousal/Participation Arousable   Attention Attends with cues to redirect   Orientation Level Oriented to person   Memory Decreased short term memory;Decreased recall of recent events;Decreased recall of precautions   Following Commands Follows one step commands with increased time or repetition   Comments hx dementia   Assessment   Limitation Decreased ADL status; Decreased UE strength;Decreased Safe judgement during ADL;Decreased cognition;Decreased endurance;Decreased high-level ADLs   Prognosis Fair   Assessment Pt is a 87y/o female admitted to the hospital after having an unwitnessed fall at home; pt noted with R knee pain--x-ray/CT(C-spine, head, R knee)=neg acute findings  Pt with PMH failure to thrive, dementia, R TKR, DM, CKD, TIA  Per last admission's notes(1/29/20), pt had assistance with her ADLs; independence with transfers/ambulation--with RW; +falls  During initial eval, pt demonstrated deficits with her functional balance, functional mobility, ADL status, transfer safety, b/l UE strength, activity tolerance(currently fair=15-20mins), and cognition(i e orientation, memory, problem-solving, judgement/safety)  If pt is able to demonstrate carryover with her tx intervention, pt would benefit from continued OT tx for the above deficits  3-5xwk/1-2wks  Goals   Patient Goals unable to assess 2* decreased cognition   STG Time Frame   (1-7 days)   Short Term Goal #1 Pt will tolerate continued cognitive/home-safety assessment and appropriate d/c recommendations will be provided  Short Term Goal #2 Pt will demonstrate mod A with their sit-stand transfers to assist with completion of their LE dressing  Short Term Goal  Pt will demonstrate improved activity tolerance to good(20-30mins) and standing tolerance to 1-3mins to assist with ADLs  LTG Time Frame   (5-7 days)   Long Term Goal #1 Pt will demonstrate improved functional balance by 1 grade to assist with ADLs/transfers  Long Term Goal #2 Pt will demonstrate proper walker/transfer safety 100% of the time  Long Term Goal Pt will demonstrate [place] with her UE and mod A with her LE bathing/dressing  Plan   Treatment Interventions ADL retraining;Functional transfer training;UE strengthening/ROM; Endurance training;Cognitive reorientation;Patient/family training;Equipment evaluation/education; Compensatory technique education;Continued evaluation   Goal Expiration Date 03/10/20   OT Treatment Day 0   OT Frequency 3-5x/wk   Recommendation   OT Discharge Recommendation 24 hour supervision/assist   Barthel Index   Feeding 5   Bathing 0   Grooming Score 0   Dressing Score 0   Bladder Score 0   Bowels Score 5   Toilet Use Score 5   Transfers (Bed/Chair) Score 5   Mobility (Level Surface) Score 0   Stairs Score 0   Barthel Index Score 20   Wellington Lovelace, OT

## 2020-02-25 NOTE — ED NOTES
Patient transported to [address] [place] [address], [address] [place] [address]  02/24/20 204

## 2020-02-25 NOTE — ED NOTES
Family that patient resides with states that the patient fell last night, 1700  They heard the fall and went to the room  The patient had struck her head and was alert and oriented to her baseline  The family denies that the patient takes any blood thinners       Alfreda Griffith RN  02/24/20 2014

## 2020-02-25 NOTE — PLAN OF CARE
Problem: PAIN - ADULT  Goal: Verbalizes/displays adequate comfort level or baseline comfort level  Description  Interventions:  - Encourage patient to monitor pain and request assistance  - Assess pain using appropriate pain scale  - Administer analgesics based on type and severity of pain and evaluate response  - Implement non-pharmacological measures as appropriate and evaluate response  - Consider cultural and social influences on pain and pain management  - Notify physician/advanced practitioner if interventions unsuccessful or patient reports new pain  Outcome: Progressing     Problem: INFECTION - ADULT  Goal: Absence or prevention of progression during hospitalization  Description  INTERVENTIONS:  - Assess and monitor for signs and symptoms of infection  - Monitor lab/diagnostic results  - Monitor all insertion sites, i e  indwelling lines, tubes, and drains  - Monitor endotracheal if appropriate and nasal secretions for changes in amount and color  - [place] appropriate cooling/warming therapies per order  - Administer medications as ordered  - Instruct and encourage patient and family to use good hand hygiene technique  - Identify and instruct in appropriate isolation precautions for identified infection/condition  Outcome: Progressing  Goal: Absence of fever/infection during neutropenic period  Description  INTERVENTIONS:  - Monitor WBC    Outcome: Progressing     Problem: SAFETY ADULT  Goal: Patient will remain free of falls  Description  INTERVENTIONS:  - Assess patient frequently for physical needs  -  Identify cognitive and physical deficits and behaviors that affect risk of falls    -  [place] fall precautions as indicated by assessment   - Educate patient/family on patient safety including physical limitations  - Instruct patient to call for assistance with activity based on assessment  - Modify environment to reduce risk of injury  - Consider OT/PT consult to assist with strengthening/mobility  Outcome: Progressing  Goal: Maintain or return to baseline ADL function  Description  INTERVENTIONS:  -  Assess patient's ability to carry out ADLs; assess patient's baseline for ADL function and identify physical deficits which impact ability to perform ADLs (bathing, care of mouth/teeth, toileting, grooming, dressing, etc )  - Assess/evaluate cause of self-care deficits   - Assess range of motion  - Assess patient's mobility; develop plan if impaired  - Assess patient's need for assistive devices and provide as appropriate  - Encourage maximum independence but intervene and supervise when necessary  - Involve family in performance of ADLs  - Assess for home care needs following discharge   - Consider OT consult to assist with ADL evaluation and planning for discharge  - Provide patient education as appropriate  Outcome: Progressing  Goal: Maintain or return mobility status to optimal level  Description  INTERVENTIONS:  - Assess patient's baseline mobility status (ambulation, transfers, stairs, etc )    - Identify cognitive and physical deficits and behaviors that affect mobility  - Identify mobility aids required to assist with transfers and/or ambulation (gait belt, sit-to-stand, lift, walker, cane, etc )  - [place] fall precautions as indicated by assessment  - Record patient progress and toleration of activity level on Mobility SBAR; progress patient to next Phase/Stage  - Instruct patient to call for assistance with activity based on assessment  - Consider rehabilitation consult to assist with strengthening/weightbearing, etc   Outcome: Progressing     Problem: DISCHARGE PLANNING  Goal: Discharge to home or other facility with appropriate resources  Description  INTERVENTIONS:  - Identify barriers to discharge w/patient and caregiver  - Arrange for needed discharge resources and transportation as appropriate  - Identify discharge learning needs (meds, wound care, etc )  - Arrange for interpretive services to assist at discharge as needed  - Refer to Case Management Department for coordinating discharge planning if the patient needs post-hospital services based on physician/advanced practitioner order or complex needs related to functional status, cognitive ability, or social support system  Outcome: Progressing     Problem: Knowledge Deficit  Goal: Patient/family/caregiver demonstrates understanding of disease process, treatment plan, medications, and discharge instructions  Description  Complete learning assessment and assess knowledge base  Interventions:  - Provide teaching at level of understanding  - Provide teaching via preferred learning methods  Outcome: Progressing     Problem: Nutrition/Hydration-ADULT  Goal: Nutrient/Hydration intake appropriate for improving, restoring or maintaining nutritional needs  Description  Monitor and assess patient's nutrition/hydration status for malnutrition  Collaborate with interdisciplinary team and initiate plan and interventions as ordered  Monitor patient's weight and dietary intake as ordered or per policy  Utilize nutrition screening tool and intervene as necessary  Determine patient's food preferences and provide high-protein, high-caloric foods as appropriate       INTERVENTIONS:  - Monitor oral intake, urinary output, labs, and treatment plans  - Assess nutrition and hydration status and recommend course of action  - Evaluate amount of meals eaten  - Assist patient with eating if necessary   - Allow adequate time for meals  - Recommend/ encourage appropriate diets, oral nutritional supplements, and vitamin/mineral supplements  - Order, calculate, and assess calorie counts as needed  - Recommend, monitor, and adjust tube feedings and TPN/PPN based on assessed needs  - Assess need for intravenous fluids  - Provide specific nutrition/hydration education as appropriate  - Include patient/family/caregiver in decisions related to nutrition  Outcome: Progressing

## 2020-02-25 NOTE — ASSESSMENT & PLAN NOTE
Failure to thrive, seen by Palliative Care in January   Consult to hospice on admission   Left daughter voice message today

## 2020-02-25 NOTE — PROGRESS NOTES
Progress Note - Susannah Paulson 1933, 80 y o  female MRN: [id number]  Unit/Bed#: [address] 2 [address] 204-01 Encounter: [id number]  Primary Care Provider: Ariel Madden DO   Date and time admitted to hospital: 2/24/2020  7:46 PM    * Failure to thrive in adult  Assessment & Plan  Failure to thrive, seen by Palliative Care in January   Consult to hospice on admission   Left daughter voice message today     Knee pain  Assessment & Plan  Xray without acute fracture or acute osseous abnormality   Supportive care   Recent palliative care evaluation last month  Consult placed to hospice for eval  Avoid narcotics in setting of dementia and advanced age      Dementia [facility])  Assessment & Plan  Patient with known dementia   Oriented to person, place only     Ischemic cardiomyopathy  Assessment & Plan  S/p CRT-D placement   Follows with Cardiology at [facility]     CHF (congestive heart failure) ([facility] [address] )  Assessment & Plan  Wt Readings from Last 3 Encounters:   02/24/20 57 8 kg (127 lb 6 8 oz)   12/11/14 71 2 kg (157 lb)   11/21/14 70 3 kg (155 lb)     Appears euvolemic  Continue home lasix   Last ECHO showed ECHO of 20-25%        CKD (chronic kidney disease)  Assessment & Plan  Renal function at baseline     Disease of thyroid gland  Assessment & Plan  Continue levothyroxine    HTN (hypertension)  Assessment & Plan  Continue home medications   Monitor VS       VTE Pharmacologic Prophylaxis:   Pharmacologic: Heparin  Mechanical VTE Prophylaxis in Place: Yes    Patient Centered Rounds: I have performed bedside rounds with nursing staff today  Discussions with Specialists or Other Care Team Provider:     Education and Discussions with Family / Patient: left voice message for daughter     Time Spent for Care: 20 minutes  More than 50% of total time spent on counseling and coordination of care as described above      Current Length of Stay: 0 day(s)    Current Patient Status: Observation   Certification Statement: The patient will continue to require additional inpatient hospital stay due to failure to thrive pending hospice consult     Discharge Plan: pending hospice consult     Code Status: Level 3 - DNAR and DNI      Subjective:   Patient has dementia which limits ROS  Sitting in chair eating oatmeal  Denies pain  Is oriented to persona nd place  Objective:     Vitals:   Temp (24hrs), Av 7 °F (36 5 °C), Min:97 5 °F (36 4 °C), Max:97 9 °F (36 6 °C)    Temp:  [97 5 °F (36 4 °C)-97 9 °F (36 6 °C)] 97 9 °F (36 6 °C)  HR:  [64-78] 72  Resp:  [16-20] 16  BP: (150-202)/(75-91) 150/75  SpO2:  [96 %-99 %] 96 %  Body mass index is 20 57 kg/m²  Input and Output Summary (last 24 hours):     No intake or output data in the 24 hours ending 20 1100    Physical Exam:     Physical Exam   Constitutional: No distress  HENT:   Head: Normocephalic  Neck: Neck supple  Cardiovascular: Normal rate and regular rhythm  Pulmonary/Chest: Effort normal and breath sounds normal    Diminished throughout    Abdominal: Soft  Bowel sounds are normal    Musculoskeletal: She exhibits tenderness (right knee)  She exhibits no edema  Neurological: She is alert  Oriented to person and place only    Skin: She is not diaphoretic  No erythema  Psychiatric: Cognition and memory are impaired  Nursing note and vitals reviewed  Additional Data:     Labs:    Results from last 7 days   Lab Units 20  2312   WBC Thousand/uL 7 44   HEMOGLOBIN g/dL 11 4*   HEMATOCRIT % 34 2*   PLATELETS Thousands/uL 203     Results from last 7 days   Lab Units 20  0016   SODIUM mmol/L 138   POTASSIUM mmol/L 4 0   CHLORIDE mmol/L 101   CO2 mmol/L 30   BUN mg/dL 22   CREATININE mg/dL 1 02   ANION GAP mmol/L 7   CALCIUM mg/dL 9 6   GLUCOSE RANDOM mg/dL 147*                           * I Have Reviewed All Lab Data Listed Above  * Additional Pertinent Lab Tests Reviewed:  All Labs Within Last 24 Hours Reviewed    Imaging:    Imaging Reports Reviewed Today Include: reviewed   Imaging Personally Reviewed by Myself Includes:  Reviewed     Recent Cultures (last 7 days):           Last 24 Hours Medication List:     Current Facility-Administered Medications:  acetaminophen 975 mg Oral Q8H [address] Krystina Mora PA-C   aspirin 81 mg Oral Daily Sona ARYAN Herndon   atorvastatin 20 mg Oral Daily With ComcastARYAN   calcitriol 0 25 mcg Oral Once per day on Mon Wed Fri Sona KatrinaLEANNA yoon-LILLIAM   calcium carbonate 1,000 mg Oral Daily PRN Sona KatrinaBENJAMIN yoonC   carvedilol 3 125 mg Oral BID With Meals Sona ARYAN Herndon   dextran 70-hypromellose 1 drop Both Eyes Q12H [address] Sona KatrinaLEANNA yoon-C   DULoxetine 30 mg Oral Daily Sona KatrinaLEANNA yoon-C   ferrous sulfate 325 mg Oral HS Sona KatrinaLEANNA yoon-C   furosemide 10 mg Oral Daily Sona Katrina PA-C   heparin (porcine) 5,000 Units Subcutaneous Q8H [address] Sona KatrinaLEANNA yoon-C   levothyroxine 50 mcg Oral Early Morning Sona Katrina PA-C   magnesium oxide 400 mg Oral After Breakfast Sona KatrinaLEANNA yoon-C   menthol-methyl salicylate  Apply externally 4x Daily PRN Sona Katrina, PA-C   ondansetron 4 mg Intravenous Q6H PRN Sona Katrina, PA-C   pantoprazole 20 mg Oral Early Morning Sona KatrinaLEANNA yoon-C        Today, Patient Was Seen By: Ed Horan PA-C    ** Please Note: Dictation voice to text software may have been used in the creation of this document   **

## 2020-02-25 NOTE — UTILIZATION REVIEW
Initial Clinical Review    Admission: Date/Time/Statement: Admission Orders (From admission, onward)     Ordered        02/24/20 7581  Place in Observation  Once                   Orders Placed This Encounter   Procedures    Place in Observation     Standing Status:   Standing     Number of Occurrences:   1     Order Specific Question:   Admitting Physician     Answer:   Moo Glover [[id number]]     Order Specific Question:   Level of Care     Answer:   Med Surg [16]     ED Arrival Information     Expected Arrival Acuity Means of Arrival Escorted By Service Admission Type    - 2/24/2020 19:46 Urgent Ambulance [address] [facility] Urgent    Arrival Complaint    FAll        Chief Complaint   Patient presents with    Knee Pain     Patient complains of right knee pain post fall yesterday  She is alert and oriented to baseline per family  Dementia hx  Assessment/Plan:  79 yo female presents to ED with Right knee pain after an unwitnessed fall @ home yesterday  She was found flat on her back  Does not appear that she lost consciousness  Patient was ambulating with her walker later that same day after they helped her to her feet  This morning she had trouble getting OOB  and walking and was complaining that her right knee was hurting  She did have a previous TKR in this knee  Takes 81 mg aspirin  She has a hx of severe dementia  On exam: Small bruise over the occiput  Tenderness to palpation over the anterior aspect of the right knee  The leg is held with the knee in flexion  Pain with extension of the knee  No acute findings on CT Head, C Spine or R Knee xray  Admitted to OBS with R Knee pain, Frequent Falls  Plan: Consult PT / OT, Consult Hospice  Previous eval by Hospice/Palliative Care on last admission  Family now in agreement that she  should be transition to more comfort based approach        ED Triage Vitals [02/24/20 1948]   Temperature Pulse Respirations Blood Pressure SpO2 97 7 °F (36 5 °C) 76 16 (!) 202/84 99 %      Temp Source Heart Rate Source Patient Position - Orthostatic VS BP Location FiO2 (%)   Oral Monitor Lying Left arm --      Pain Score       5        Wt Readings from Last 1 Encounters:   02/24/20 57 8 kg (127 lb 6 8 oz)     Additional Vital Signs:   02/25/20 06:59:12  97 9 °F (36 6 °C) 72 16 150/75 96 %     02/25/20 00:32:14  97 5 °F (36 4 °C) 70 20 176/85Abnormal  96 %     02/25/20 0018   64 16 170/80 96 % None (Room air) Lying   02/24/20 2231  97 6 °F (36 4 °C) 78 16 163/91 96 % None (Room air) Lying   02/24/20 2024   72 16 190/85Abnormal  98 % None (Room air)        Pertinent Labs/Diagnostic Test Results:   Results from last 7 days   Lab Units 02/24/20  2312   WBC Thousand/uL 7 44   HEMOGLOBIN g/dL 11 4*   HEMATOCRIT % 34 2*   PLATELETS Thousands/uL 203     Results from last 7 days   Lab Units 02/25/20  0016   SODIUM mmol/L 138   POTASSIUM mmol/L 4 0   CHLORIDE mmol/L 101   CO2 mmol/L 30   ANION GAP mmol/L 7   BUN mg/dL 22   CREATININE mg/dL 1 02   EGFR ml/min/1 73sq m 50   CALCIUM mg/dL 9 6     Results from last 7 days   Lab Units 02/25/20  0016   GLUCOSE RANDOM mg/dL 147*     2/24 CT Head:    No acute intracranial hemorrhage, mass effect or extra-axial collection  2   Posterior parietal occipital scalp hematoma   No acute calvarial fracture  2/24 CT C Spine: No acute cervical spine fracture or traumatic malalignment  2/24 Right Knee Xray: No acute osseous abnormality      ED Treatment:   Medication Administration from 02/24/2020 1946 to 02/25/2020 0031       Date/Time Order Dose Route Action     02/24/2020 2051 acetaminophen (TYLENOL) oral suspension 650 mg 650 mg Oral Given        Past Medical History:   Diagnosis Date    CHF (congestive heart failure) (HCC)     Dementia (HCC)     Depression     Diabetes mellitus (HCC)     Disease of thyroid gland     Kidney disease, chronic, stage III (moderate, EGFR 30-59 ml/min) (HCC)     Pacemaker     TIA (transient ischemic attack)      Present on Admission:   HTN (hypertension)   Dementia ([facility] [address] )   CKD (chronic kidney disease)   CH[facility]congestive heart failure) ([facility])      Admitting Diagnosis: Knee pain [M25 569]  Dementia ([facility] [address] ) [F03 90]  Ambulatory dysfunction [R26 2]  Acute pain of right knee [M25 561]     Age/Sex: 80 y o  female  Admission Orders:  Scheduled Medications:  Medications:  aspirin 81 mg Oral Daily   atorvastatin 20 mg Oral Daily With Dinner   calcitriol 0 25 mcg Oral Once per day on Mon Wed Fri   carvedilol 3 125 mg Oral BID With Meals   dextran 70-hypromellose 1 drop Both Eyes Q12H EMILIE   DULoxetine 30 mg Oral Daily   ferrous sulfate 325 mg Oral HS   furosemide 10 mg Oral Daily   heparin (porcine) 5,000 Units Subcutaneous Q8H [facility]   levothyroxine 50 mcg Oral Early Morning   magnesium oxide 400 mg Oral After Breakfast   pantoprazole 20 mg Oral Early Morning     Continuous IV Infusions:     PRN Meds:  acetaminophen 650 mg Oral Q6H PRN   calcium carbonate 1,000 mg Oral Daily PRN   menthol-methyl salicylate  Apply externally 4x Daily PRN   morphine injection 2 mg Intravenous Q4H PRN   ondansetron 4 mg Intravenous Q6H PRN       IP CONSULT TO HOSPICE  IP CONSULT TO CASE MANAGEMENT  SCDs      Network Utilization Review Department  [email or web address]  ATTENTION: Please call with any questions or concerns to [phone] and carefully listen to the prompts so that you are directed to the right person  All voicemails are confidential   Yecenia Euceda all requests for admission clinical reviews, approved or denied determinations and any other requests to dedicated fax number below belonging to the campus where the patient is receiving treatment   List of dedicated fax numbers for the Facilities:  [address] [place] [address] DENIALS (Administrative/Medical Necessity) [phone]   [address] [place] [address] (Maternity/NICU/Pediatrics) [phone]   Uyen Alexandra [phone] Vidhi Comes [phone]   Elayne Specter [phone]   [address] [phone]   [facility]  [phone]   [address] [facility] [address]  [address] [facility] [address] [facility] [phone]

## 2020-02-26 PROBLEM — R13.10 DYSPHAGIA: Status: ACTIVE | Noted: 2020-02-26

## 2020-02-26 PROCEDURE — 99225 PR SBSQ OBSERVATION CARE/DAY 25 MINUTES: CPT | Performed by: NURSE PRACTITIONER

## 2020-02-26 PROCEDURE — 92610 EVALUATE SWALLOWING FUNCTION: CPT

## 2020-02-26 RX ADMIN — MAGNESIUM GLUCONATE 500 MG ORAL TABLET 400 MG: 500 TABLET ORAL at 08:16

## 2020-02-26 RX ADMIN — DEXTRAN 70 AND HYPROMELLOSE 2910 1 DROP: 1; 3 SOLUTION/ DROPS OPHTHALMIC at 21:10

## 2020-02-26 RX ADMIN — ASPIRIN 81 MG 81 MG: 81 TABLET ORAL at 08:16

## 2020-02-26 RX ADMIN — PANTOPRAZOLE SODIUM 20 MG: 20 TABLET, DELAYED RELEASE ORAL at 05:44

## 2020-02-26 RX ADMIN — LEVOTHYROXINE SODIUM 50 MCG: 50 TABLET ORAL at 05:44

## 2020-02-26 RX ADMIN — HEPARIN SODIUM 5000 UNITS: 5000 INJECTION INTRAVENOUS; SUBCUTANEOUS at 21:09

## 2020-02-26 RX ADMIN — DEXTRAN 70 AND HYPROMELLOSE 2910 1 DROP: 1; 3 SOLUTION/ DROPS OPHTHALMIC at 08:17

## 2020-02-26 RX ADMIN — FUROSEMIDE 10 MG: 20 TABLET ORAL at 08:16

## 2020-02-26 RX ADMIN — CARVEDILOL 3.12 MG: 3.12 TABLET, FILM COATED ORAL at 08:16

## 2020-02-26 RX ADMIN — HEPARIN SODIUM 5000 UNITS: 5000 INJECTION INTRAVENOUS; SUBCUTANEOUS at 05:44

## 2020-02-26 RX ADMIN — ACETAMINOPHEN 975 MG: 325 TABLET ORAL at 21:10

## 2020-02-26 RX ADMIN — DULOXETINE HYDROCHLORIDE 30 MG: 30 CAPSULE, DELAYED RELEASE ORAL at 08:16

## 2020-02-26 RX ADMIN — FERROUS SULFATE TAB 325 MG (65 MG ELEMENTAL FE) 325 MG: 325 (65 FE) TAB at 21:10

## 2020-02-26 RX ADMIN — ACETAMINOPHEN 975 MG: 325 TABLET ORAL at 05:44

## 2020-02-26 RX ADMIN — ATORVASTATIN CALCIUM 20 MG: 20 TABLET, FILM COATED ORAL at 15:34

## 2020-02-26 RX ADMIN — CARVEDILOL 3.12 MG: 3.12 TABLET, FILM COATED ORAL at 15:34

## 2020-02-26 NOTE — PROGRESS NOTES
Patient exhibited some pocketing of food during meals and medication administration  Speech Therapy consulted and evaluated patient  RN currently crushing medications and patient able to swallow  Patient in no pain at time, pain medication held until patient and/or family requests  Also holding heparin due to patient's impending hospice status  Awaiting further orders  Will continue to monitor      Grant Taylor RN 2/26/2020 1:30 PM

## 2020-02-26 NOTE — DISCHARGE INSTR - DIET
Puree w/ thin liquids  Crush meds  Avoid foods at room temperature  Food should be flavorful  Place the spoon, straw, or cup in front of pt's mouth to promote a swallow  Make sure she swallows after each bite

## 2020-02-26 NOTE — PROGRESS NOTES
[facility]  Progress Note - Risa Gomez 1933, 80 y o  female MRN: [id number]    Unit/Bed#: [address] 2 [address] 204-01 Encounter: [id number]    Primary Care Provider: Chrissy Birmingham DO   Date and time admitted to hospital: 2/24/2020  7:46 PM        * Failure to thrive in adult  Assessment & Plan  · Failure to thrive, seen by Palliative Care and Hospice in January  Family declined services at that time  · Consult to Hospice appreciated  They met with daughter and son-in-law  She qualifies and consents signed  They will admit her when home on Friday  · Plan to discharge tomorrow    Dysphagia  Assessment & Plan  · On modified diet from previous evaluation with SLP in January  · Seen by SLP today for continued issues with eating / swallowing  · Now on pureed diet with thin liquids    Ischemic cardiomyopathy  Assessment & Plan  · S/p CRT-D placement   · Follows with Cardiology at [place] [address] of thyroid gland  Assessment & Plan  · Continue levothyroxine    Knee pain  Assessment & Plan  · Xray without acute fracture or acute osseous abnormality   · Supportive care   · Avoid narcotics in setting of dementia and advanced age      Dementia [facility])  Assessment & Plan  · Patient with known dementia   · Oriented to person, place only     HTN (hypertension)  Assessment & Plan  · Continue home medications   · Monitor VS     CKD (chronic kidney disease)  Assessment & Plan  · Renal function at baseline     CHF (congestive heart failure) ([facility] [address] )  Assessment & Plan  Wt Readings from Last 3 Encounters:   02/24/20 57 8 kg (127 lb 6 8 oz)   12/11/14 71 2 kg (157 lb)   11/21/14 70 3 kg (155 lb)     · Appears euvolemic  · Continue home Lasix   · Last ECHO showed ECHO of 20-25%            VTE Pharmacologic Prophylaxis:   Pharmacologic: Heparin  Mechanical VTE Prophylaxis in Place: Yes    Patient Centered Rounds: I have performed bedside rounds with nursing staff today      Discussions with Specialists or Other Care Team Provider: Provider notes reviewed  Discussed with Case Management / Hospice  Education and Discussions with Family / Patient: Plan of care with daughter  Time Spent for Care: 20 minutes  More than 50% of total time spent on counseling and coordination of care as described above  Current Length of Stay: 0 day(s)    Current Patient Status: Observation   Certification Statement: The patient will continue to require additional inpatient hospital stay due to discharge arrangements    Discharge Plan: Probable discharge tomorrow    Code Status: Level 3 - DNAR and DNI      Subjective:   Patient not answering questions when seen this morning  Objective:     Vitals:   Temp (24hrs), Av 5 °F (36 4 °C), Min:97 4 °F (36 3 °C), Max:97 7 °F (36 5 °C)    Temp:  [97 4 °F (36 3 °C)-97 7 °F (36 5 °C)] 97 4 °F (36 3 °C)  HR:  [71-78] 71  Resp:  [16-18] 16  BP: (151-154)/(77-82) 151/77  SpO2:  [96 %-98 %] 97 %  Body mass index is 20 57 kg/m²  Input and Output Summary (last 24 hours):     No intake or output data in the 24 hours ending 20 1512    Physical Exam:     Physical Exam   Constitutional: She appears well-developed and well-nourished  No distress  HENT:   Head: Normocephalic and atraumatic  Eyes: Conjunctivae are normal  No scleral icterus  Cardiovascular: Normal rate, regular rhythm and normal heart sounds  No murmur heard  Pulmonary/Chest: Effort normal and breath sounds normal  No respiratory distress  She has no wheezes  She has no rales  Abdominal: Soft  Bowel sounds are normal  She exhibits no distension  There is no tenderness  Musculoskeletal: Normal range of motion  She exhibits no edema or tenderness  Neurological: She is alert  Skin: Skin is warm and dry  She is not diaphoretic  Psychiatric: She has a normal mood and affect  Her behavior is normal  Cognition and memory are impaired  She is inattentive  Nursing note and vitals reviewed        Additional Data: Labs:    Results from last 7 days   Lab Units 02/24/20  2312   WBC Thousand/uL 7 44   HEMOGLOBIN g/dL 11 4*   HEMATOCRIT % 34 2*   PLATELETS Thousands/uL 203     Results from last 7 days   Lab Units 02/25/20  0016   SODIUM mmol/L 138   POTASSIUM mmol/L 4 0   CHLORIDE mmol/L 101   CO2 mmol/L 30   BUN mg/dL 22   CREATININE mg/dL 1 02   ANION GAP mmol/L 7   CALCIUM mg/dL 9 6   GLUCOSE RANDOM mg/dL 147*                           * I Have Reviewed All Lab Data Listed Above  * Additional Pertinent Lab Tests Reviewed:  [facility] Admission Reviewed    Imaging:    Imaging Reports Reviewed Today Include: XR right knee 2/24  Imaging Personally Reviewed by Myself Includes:  None    Recent Cultures (last 7 days):           Last 24 Hours Medication List:     Current Facility-Administered Medications:  acetaminophen 975 mg Oral Q8H [address] Krystina Mora PA-C   aspirin 81 mg Oral Daily Armond Owusu PA-C   atorvastatin 20 mg Oral Daily With ComARYAN jj   calcitriol 0 25 mcg Oral Once per day on Mon Wed Fri Armond Owusu PA-C   calcium carbonate 1,000 mg Oral Daily PRN Armond Owusu PA-C   carvedilol 3 125 mg Oral BID With Meals Armond Owusu PA-C   dextran 70-hypromellose 1 drop Both Eyes Q12H [address] Armond Owusu PA-C   DULoxetine 30 mg Oral Daily Armond Owusu PA-C   ferrous sulfate 325 mg Oral HS Armond Owusu PA-C   furosemide 10 mg Oral Daily Armond Owusu PA-C   heparin (porcine) 5,000 Units Subcutaneous Q8H [address] Armond Owusu PA-C   levothyroxine 50 mcg Oral Early Morning Armond Owusu PA-C   magnesium oxide 400 mg Oral After Breakfast Armond Owusu PA-C   menthol-methyl salicylate  Apply externally 4x Daily PRN Armond Owusu PA-C   ondansetron 4 mg Intravenous Q6H PRN Armond Owusu PA-C   pantoprazole 20 mg Oral Early Morning Armond Owusu PA-C        Today, Patient Was Seen By: ANDREIA Garcia    ** Please Note: Dictation voice to text software may have been used in the creation of this document   **

## 2020-02-26 NOTE — ASSESSMENT & PLAN NOTE
· Failure to thrive, seen by Palliative Care and Hospice in January  Family declined services at that time  · Consult to Hospice appreciated  They met with daughter and son-in-law  She qualifies and consents signed  They will admit her when home on Friday    · Plan to discharge tomorrow

## 2020-02-26 NOTE — PLAN OF CARE
Problem: PAIN - ADULT  Goal: Verbalizes/displays adequate comfort level or baseline comfort level  Description  Interventions:  - Encourage patient to monitor pain and request assistance  - Assess pain using appropriate pain scale  - Administer analgesics based on type and severity of pain and evaluate response  - Implement non-pharmacological measures as appropriate and evaluate response  - Consider cultural and social influences on pain and pain management  - Notify physician/advanced practitioner if interventions unsuccessful or patient reports new pain  Outcome: Progressing     Problem: INFECTION - ADULT  Goal: Absence or prevention of progression during hospitalization  Description  INTERVENTIONS:  - Assess and monitor for signs and symptoms of infection  - Monitor lab/diagnostic results  - Monitor all insertion sites, i e  indwelling lines, tubes, and drains  - Monitor endotracheal if appropriate and nasal secretions for changes in amount and color  - [place] appropriate cooling/warming therapies per order  - Administer medications as ordered  - Instruct and encourage patient and family to use good hand hygiene technique  - Identify and instruct in appropriate isolation precautions for identified infection/condition  Outcome: Progressing  Goal: Absence of fever/infection during neutropenic period  Description  INTERVENTIONS:  - Monitor WBC    Outcome: Progressing     Problem: SAFETY ADULT  Goal: Patient will remain free of falls  Description  INTERVENTIONS:  - Assess patient frequently for physical needs  -  Identify cognitive and physical deficits and behaviors that affect risk of falls    -  [place] fall precautions as indicated by assessment   - Educate patient/family on patient safety including physical limitations  - Instruct patient to call for assistance with activity based on assessment  - Modify environment to reduce risk of injury  - Consider OT/PT consult to assist with strengthening/mobility  Outcome: Progressing  Goal: Maintain or return to baseline ADL function  Description  INTERVENTIONS:  -  Assess patient's ability to carry out ADLs; assess patient's baseline for ADL function and identify physical deficits which impact ability to perform ADLs (bathing, care of mouth/teeth, toileting, grooming, dressing, etc )  - Assess/evaluate cause of self-care deficits   - Assess range of motion  - Assess patient's mobility; develop plan if impaired  - Assess patient's need for assistive devices and provide as appropriate  - Encourage maximum independence but intervene and supervise when necessary  - Involve family in performance of ADLs  - Assess for home care needs following discharge   - Consider OT consult to assist with ADL evaluation and planning for discharge  - Provide patient education as appropriate  Outcome: Progressing  Goal: Maintain or return mobility status to optimal level  Description  INTERVENTIONS:  - Assess patient's baseline mobility status (ambulation, transfers, stairs, etc )    - Identify cognitive and physical deficits and behaviors that affect mobility  - Identify mobility aids required to assist with transfers and/or ambulation (gait belt, sit-to-stand, lift, walker, cane, etc )  - [place] fall precautions as indicated by assessment  - Record patient progress and toleration of activity level on Mobility SBAR; progress patient to next Phase/Stage  - Instruct patient to call for assistance with activity based on assessment  - Consider rehabilitation consult to assist with strengthening/weightbearing, etc   Outcome: Progressing     Problem: DISCHARGE PLANNING  Goal: Discharge to home or other facility with appropriate resources  Description  INTERVENTIONS:  - Identify barriers to discharge w/patient and caregiver  - Arrange for needed discharge resources and transportation as appropriate  - Identify discharge learning needs (meds, wound care, etc )  - Arrange for interpretive services to assist at discharge as needed  - Refer to Case Management Department for coordinating discharge planning if the patient needs post-hospital services based on physician/advanced practitioner order or complex needs related to functional status, cognitive ability, or social support system  Outcome: Progressing     Problem: Knowledge Deficit  Goal: Patient/family/caregiver demonstrates understanding of disease process, treatment plan, medications, and discharge instructions  Description  Complete learning assessment and assess knowledge base  Interventions:  - Provide teaching at level of understanding  - Provide teaching via preferred learning methods  Outcome: Progressing     Problem: Nutrition/Hydration-ADULT  Goal: Nutrient/Hydration intake appropriate for improving, restoring or maintaining nutritional needs  Description  Monitor and assess patient's nutrition/hydration status for malnutrition  Collaborate with interdisciplinary team and initiate plan and interventions as ordered  Monitor patient's weight and dietary intake as ordered or per policy  Utilize nutrition screening tool and intervene as necessary  Determine patient's food preferences and provide high-protein, high-caloric foods as appropriate       INTERVENTIONS:  - Monitor oral intake, urinary output, labs, and treatment plans  - Assess nutrition and hydration status and recommend course of action  - Evaluate amount of meals eaten  - Assist patient with eating if necessary   - Allow adequate time for meals  - Recommend/ encourage appropriate diets, oral nutritional supplements, and vitamin/mineral supplements  - Order, calculate, and assess calorie counts as needed  - Recommend, monitor, and adjust tube feedings and TPN/PPN based on assessed needs  - Assess need for intravenous fluids  - Provide specific nutrition/hydration education as appropriate  - Include patient/family/caregiver in decisions related to nutrition  Outcome: Progressing     Problem: Prexisting or High Potential for Compromised Skin Integrity  Goal: Skin integrity is maintained or improved  Description  INTERVENTIONS:  - Identify patients at risk for skin breakdown  - Assess and monitor skin integrity  - Assess and monitor nutrition and hydration status  - Monitor labs   - Assess for incontinence   - Turn and reposition patient  - Assist with mobility/ambulation  - Relieve pressure over bony prominences  - Avoid friction and shearing  - Provide appropriate hygiene as needed including keeping skin clean and dry  - Evaluate need for skin moisturizer/barrier cream  - Collaborate with interdisciplinary team   - Patient/family teaching  - Consider wound care consult   Outcome: Progressing     Problem: Potential for Falls  Goal: Patient will remain free of falls  Description  INTERVENTIONS:  - Assess patient frequently for physical needs  -  Identify cognitive and physical deficits and behaviors that affect risk of falls    -  [place] fall precautions as indicated by assessment   - Educate patient/family on patient safety including physical limitations  - Instruct patient to call for assistance with activity based on assessment  - Modify environment to reduce risk of injury  - Consider OT/PT consult to assist with strengthening/mobility  Outcome: Progressing     Problem: MUSCULOSKELETAL - ADULT  Goal: Maintain or return mobility to safest level of function  Description  INTERVENTIONS:  - Assess patient's ability to carry out ADLs; assess patient's baseline for ADL function and identify physical deficits which impact ability to perform ADLs (bathing, care of mouth/teeth, toileting, grooming, dressing, etc )  - Assess/evaluate cause of self-care deficits   - Assess range of motion  - Assess patient's mobility  - Assess patient's need for assistive devices and provide as appropriate  - Encourage maximum independence but intervene and supervise when necessary  - Involve family in performance of ADLs  - Assess for home care needs following discharge   - Consider OT consult to assist with ADL evaluation and planning for discharge  - Provide patient education as appropriate  Outcome: Progressing  Goal: Maintain proper alignment of affected body part  Description  INTERVENTIONS:  - Support, maintain and protect limb and body alignment  - Provide patient/ family with appropriate education  Outcome: Progressing

## 2020-02-26 NOTE — ASSESSMENT & PLAN NOTE
Wt Readings from Last 3 Encounters:   02/24/20 57 8 kg (127 lb 6 8 oz)   12/11/14 71 2 kg (157 lb)   11/21/14 70 3 kg (155 lb)     · Appears euvolemic  · Continue home Lasix   · Last ECHO showed ECHO of 20-25%

## 2020-02-26 NOTE — ASSESSMENT & PLAN NOTE
· Xray without acute fracture or acute osseous abnormality   · Supportive care   · Avoid narcotics in setting of dementia and advanced age

## 2020-02-26 NOTE — HOSPICE NOTE
Met with patient's daughter and son in law at bedside to discuss hospice services  Patient was previously referred to our services during her admission in January 2020 and family declined at that time  Patient approved for home hospice services; consents signed and patient to be admitted to service on Friday  MADAY Wadsworth, and Dr Josh Woodward made aware of aforementioned information  Thank you

## 2020-02-26 NOTE — ASSESSMENT & PLAN NOTE
· On modified diet from previous evaluation with SLP in January  · Seen by SLP today for continued issues with eating / swallowing  · Now on pureed diet with thin liquids

## 2020-02-26 NOTE — SPEECH THERAPY NOTE
Speech Language/Pathology  Speech/Language Pathology  Assessment    Patient Name: Ree Escobar  UZDMD'C Date: 2/26/2020     Problem List  Principal Problem:    Failure to thrive in adult  Active Problems:    CHF (congestive heart failure) (HCC)    CKD (chronic kidney disease)    HTN (hypertension)    Dementia (HCC)    Knee pain    Disease of thyroid gland    Ischemic cardiomyopathy    Past Medical History  Past Medical History:   Diagnosis Date    CHF (congestive heart failure) (HCC)     Dementia (HCC)     Depression     Diabetes mellitus ([address] )     Disease of thyroid gland     Kidney disease, chronic, stage III (moderate, EGFR 30-59 ml/min) (HCC)     Pacemaker     TIA (transient ischemic attack)         Bedside Swallow Evaluation:    Summary:  Pt presents similar to when seen on initial day last admit  (she improved on day 2 after fluids)  Today she was able to swallow and transfer cold pree  Transfer was delayed but when the next presentation was placed in front of her mouth she swallowed  Some incoordination noted w/ straw but she was able to tolerate small sips of cold sour juice wfl  Consider lifting the fluid restriction? ? Recommendations:  Diet: puree  Liquid:thin  Meds:crush  Supervision:full  Positioning:Upright  Strategies: Pt to take PO/Meds only when fully alert and upright  Avoid foods that have little flavor or at at room temp/less stimulating  Oral care  Aspiration precautions  Reflux precautions    Therapy Prognosis: guarded  Prognosis considerations:dementia  ? hospice  Frequency: ? 1-2x vs hospice    Goal(s):  Pt will tolerate least restrictive diet w/out s/s aspiration or oral/pharyngeal difficulties       Patient's goal:none stated    Consider consult w/:  Nutrition    Reason for consult:  R/o aspiration  Determine safest and least restrictive diet  poor intake  weight loss   h/o dysphagia   Pocketing this am    Current diet:  Level 2 w/ thin liquids  Premorbid diet[de-identified]  Pt was on placed puree w/ nectar on initial day seen her last admit  Advanced to [facility] soft and thin on 2nd day  Previous VBS:  none  O2 requirement:  none  Voice/Speech:  Low volume but wfl  Social:  Home w/ family, hospice consulted  Follows commands:  inconsistent                    Cognitive Status:  Pleasantly confused  Oral [facility] exam:  Dentition:partial natural  Labial strength and ROM:generalized weakness  Lingual strength and ROM:^  Mandibular strength and ROM:^  Secretion management:wnl  Volitional cough:unable    Items administered:  Cold puree, cold sour juice    Oral stage:mod  Lip closure:reduced w/ straw  Mastication:na  Bolus formation:aequate  Bolus control: wfl  Transfer:delayed  Oral residue: one visible  Pocketing:none    Pharyngeal stage: no overt s/s this session  Swallow promptness:prompt  Laryngeal rise:wfl  Wet voice:no  Throat clear:no  Cough:no  Secondary swallows: no  Audible swallows:no  No overt s/s aspiration    Esophageal stage:  No s/s reported    Aspiration precautions posted    Results d/w:  Pt, nursing, crnp    Past Surgical History  Past Surgical History:   Procedure Laterality Date    REPLACEMENT TOTAL KNEE Right     RIB FRACTURE SURGERY  07/2018, 10/29/2019     Attestation signed by Chris Carlson DO at 2/25/2020 12:21 AM      IIndu DO, saw and evaluated the patient  I have discussed the patient with the nurse practioner/PA's findings, Plan of Care, and MDM as documented in their note, except where noted  All available labs and Radiology studies were reviewed    At this point I agree with the current assessment done in the Emergency Department      I have conducted an independent evaluation of this patient a history and physical is as follows:     Mrs Khalif Sosa presents with ambulatory dysfunction in the setting of failure to thrive    She has been previously evaluated by hospice and palliative care, and family is now in agreement that she should be transition to more comfort based approach      At this time will continue current medications as she has previously taking  Consider liberalizing diet with a goal of comfort  Patient family initially has refuse labs which is more than reasonable if they are feeling that she should be transitioned sooner rather than later to hospice services        All questions answered to the patient's satisfaction and patient and family member agreement with care plan  Chief Complaint:   Knee pain     History of Present Illness:     Ree Escobar is a 80 y o  female who presents with right knee pain s/p fall at home  Patient lives with daughter  Patient was recently admitted with failure to thrive and was evaluated by palliative care  She is DNR/DNI  She had an unwitnessed fall yesterday  She was found flat on her back  She had a prior TKR in this knee  Stefanie Console

## 2020-02-26 NOTE — PROGRESS NOTES
Per Abby Johnson, patient's daughter to come in tomorrow morning before discharge to see current level of care patient needs, as patient will be receiving at home hospice care and family will still need to provide care to patient when hospice personal is not at their home  RN will informed night shift RN to inform day shift RN for 2/27/20  Also, as patient is not technically on comfort care or hospice at time during hospitalization, patient to receive all current medications as tolerated  Diet also adjusted per Speech recommendations      Mio Nina RN 2/26/2020 3:30 PM

## 2020-02-26 NOTE — UTILIZATION REVIEW
Continued Stay Review    Date: 2/26/20                        Current Patient Class: OBS Current Level of Care: Sly Mcghee y o  female initially admitted on 2/24/20 @ 2258 to OBS     Assessment/Plan: HOSPICE EVAL - approved for Home Hospice and plan to discharge home tomorrow  Speech saw pt today and started pureed diet with thin liquids    Pertinent Labs/Diagnostic Results:   Results from last 7 days   Lab Units 02/24/20  2312   WBC Thousand/uL 7 44   HEMOGLOBIN g/dL 11 4*   HEMATOCRIT % 34 2*   PLATELETS Thousands/uL 203     Results from last 7 days   Lab Units 02/25/20  0016   SODIUM mmol/L 138   POTASSIUM mmol/L 4 0   CHLORIDE mmol/L 101   CO2 mmol/L 30   ANION GAP mmol/L 7   BUN mg/dL 22   CREATININE mg/dL 1 02   EGFR ml/min/1 73sq m 50   CALCIUM mg/dL 9 6     Results from last 7 days   Lab Units 02/25/20  0016   GLUCOSE RANDOM mg/dL 147*         Vital Signs:   02/26/20 07:41:45  97 7 °F (36 5 °C)  71  16  154/79  104  98 %          Medications:   Scheduled Medications:  Medications:  acetaminophen 975 mg Oral Q8H [address]   aspirin 81 mg Oral Daily   atorvastatin 20 mg Oral Daily With Dinner   calcitriol 0 25 mcg Oral Once per day on Mon Wed Fri   carvedilol 3 125 mg Oral BID With Meals   dextran 70-hypromellose 1 drop Both Eyes Q12H EMILIE   DULoxetine 30 mg Oral Daily   ferrous sulfate 325 mg Oral HS   furosemide 10 mg Oral Daily   heparin (porcine) 5,000 Units Subcutaneous Q8H [address]   levothyroxine 50 mcg Oral Early Morning   magnesium oxide 400 mg Oral After Breakfast   pantoprazole 20 mg Oral Early Morning     Continuous IV Infusions:     PRN Meds:  calcium carbonate 1,000 mg Oral Daily PRN   menthol-methyl salicylate  Apply externally 4x Daily PRN   ondansetron 4 mg Intravenous Q6H PRN       Discharge Plan: TBD    Network Utilization Review Department  [email or web address]  ATTENTION: Please call with any questions or concerns to [phone] and carefully listen to the prompts so that you are directed to the right person  All voicemails are confidential   Aimee Craft all requests for admission clinical reviews, approved or denied determinations and any other requests to dedicated fax number below belonging to the campus where the patient is receiving treatment   List of dedicated fax numbers for the Facilities:  [address] [place] [address] DENIALS (Administrative/Medical Necessity) [phone]   [address] (Maternity/NICU/Pediatrics) [phone]   Luis Crowell [phone]   Lamberto Caller [phone]   Zari Macias [phone]   [facility] [phone]   [address] [place] [address] [phone]   [facility]  [phone]   [address] [facility] [address]  [address] [facility] [address] [facility] [phone]

## 2020-02-26 NOTE — PLAN OF CARE
Problem: PAIN - ADULT  Goal: Verbalizes/displays adequate comfort level or baseline comfort level  Description  Interventions:  - Encourage patient to monitor pain and request assistance  - Assess pain using appropriate pain scale  - Administer analgesics based on type and severity of pain and evaluate response  - Implement non-pharmacological measures as appropriate and evaluate response  - Consider cultural and social influences on pain and pain management  - Notify physician/advanced practitioner if interventions unsuccessful or patient reports new pain  Outcome: Progressing     Problem: INFECTION - ADULT  Goal: Absence or prevention of progression during hospitalization  Description  INTERVENTIONS:  - Assess and monitor for signs and symptoms of infection  - Monitor lab/diagnostic results  - Monitor all insertion sites, i e  indwelling lines, tubes, and drains  - Monitor endotracheal if appropriate and nasal secretions for changes in amount and color  - [place] appropriate cooling/warming therapies per order  - Administer medications as ordered  - Instruct and encourage patient and family to use good hand hygiene technique  - Identify and instruct in appropriate isolation precautions for identified infection/condition  Outcome: Progressing  Goal: Absence of fever/infection during neutropenic period  Description  INTERVENTIONS:  - Monitor WBC    Outcome: Progressing     Problem: SAFETY ADULT  Goal: Patient will remain free of falls  Description  INTERVENTIONS:  - Assess patient frequently for physical needs  -  Identify cognitive and physical deficits and behaviors that affect risk of falls    -  [place] fall precautions as indicated by assessment   - Educate patient/family on patient safety including physical limitations  - Instruct patient to call for assistance with activity based on assessment  - Modify environment to reduce risk of injury  - Consider OT/PT consult to assist with strengthening/mobility  Outcome: Progressing  Goal: Maintain or return to baseline ADL function  Description  INTERVENTIONS:  -  Assess patient's ability to carry out ADLs; assess patient's baseline for ADL function and identify physical deficits which impact ability to perform ADLs (bathing, care of mouth/teeth, toileting, grooming, dressing, etc )  - Assess/evaluate cause of self-care deficits   - Assess range of motion  - Assess patient's mobility; develop plan if impaired  - Assess patient's need for assistive devices and provide as appropriate  - Encourage maximum independence but intervene and supervise when necessary  - Involve family in performance of ADLs  - Assess for home care needs following discharge   - Consider OT consult to assist with ADL evaluation and planning for discharge  - Provide patient education as appropriate  Outcome: Progressing  Goal: Maintain or return mobility status to optimal level  Description  INTERVENTIONS:  - Assess patient's baseline mobility status (ambulation, transfers, stairs, etc )    - Identify cognitive and physical deficits and behaviors that affect mobility  - Identify mobility aids required to assist with transfers and/or ambulation (gait belt, sit-to-stand, lift, walker, cane, etc )  - [place] fall precautions as indicated by assessment  - Record patient progress and toleration of activity level on Mobility SBAR; progress patient to next Phase/Stage  - Instruct patient to call for assistance with activity based on assessment  - Consider rehabilitation consult to assist with strengthening/weightbearing, etc   Outcome: Progressing     Problem: DISCHARGE PLANNING  Goal: Discharge to home or other facility with appropriate resources  Description  INTERVENTIONS:  - Identify barriers to discharge w/patient and caregiver  - Arrange for needed discharge resources and transportation as appropriate  - Identify discharge learning needs (meds, wound care, etc )  - Arrange for interpretive services to assist at discharge as needed  - Refer to Case Management Department for coordinating discharge planning if the patient needs post-hospital services based on physician/advanced practitioner order or complex needs related to functional status, cognitive ability, or social support system  Outcome: Progressing     Problem: Knowledge Deficit  Goal: Patient/family/caregiver demonstrates understanding of disease process, treatment plan, medications, and discharge instructions  Description  Complete learning assessment and assess knowledge base  Interventions:  - Provide teaching at level of understanding  - Provide teaching via preferred learning methods  Outcome: Progressing     Problem: Nutrition/Hydration-ADULT  Goal: Nutrient/Hydration intake appropriate for improving, restoring or maintaining nutritional needs  Description  Monitor and assess patient's nutrition/hydration status for malnutrition  Collaborate with interdisciplinary team and initiate plan and interventions as ordered  Monitor patient's weight and dietary intake as ordered or per policy  Utilize nutrition screening tool and intervene as necessary  Determine patient's food preferences and provide high-protein, high-caloric foods as appropriate       INTERVENTIONS:  - Monitor oral intake, urinary output, labs, and treatment plans  - Assess nutrition and hydration status and recommend course of action  - Evaluate amount of meals eaten  - Assist patient with eating if necessary   - Allow adequate time for meals  - Recommend/ encourage appropriate diets, oral nutritional supplements, and vitamin/mineral supplements  - Order, calculate, and assess calorie counts as needed  - Recommend, monitor, and adjust tube feedings and TPN/PPN based on assessed needs  - Assess need for intravenous fluids  - Provide specific nutrition/hydration education as appropriate  - Include patient/family/caregiver in decisions related to nutrition  Outcome: Progressing     Problem: Prexisting or High Potential for Compromised Skin Integrity  Goal: Skin integrity is maintained or improved  Description  INTERVENTIONS:  - Identify patients at risk for skin breakdown  - Assess and monitor skin integrity  - Assess and monitor nutrition and hydration status  - Monitor labs   - Assess for incontinence   - Turn and reposition patient  - Assist with mobility/ambulation  - Relieve pressure over bony prominences  - Avoid friction and shearing  - Provide appropriate hygiene as needed including keeping skin clean and dry  - Evaluate need for skin moisturizer/barrier cream  - Collaborate with interdisciplinary team   - Patient/family teaching  - Consider wound care consult   Outcome: Progressing

## 2020-02-27 VITALS
OXYGEN SATURATION: 99 % | HEART RATE: 71 BPM | TEMPERATURE: 97.3 F | DIASTOLIC BLOOD PRESSURE: 66 MMHG | HEIGHT: 66 IN | RESPIRATION RATE: 16 BRPM | WEIGHT: 127.43 LBS | BODY MASS INDEX: 20.48 KG/M2 | SYSTOLIC BLOOD PRESSURE: 144 MMHG

## 2020-02-27 LAB
BACTERIA UR QL AUTO: ABNORMAL /HPF
BILIRUB UR QL STRIP: NEGATIVE
CLARITY UR: ABNORMAL
COLOR UR: YELLOW
GLUCOSE UR STRIP-MCNC: NEGATIVE MG/DL
HGB UR QL STRIP.AUTO: ABNORMAL
KETONES UR STRIP-MCNC: NEGATIVE MG/DL
LEUKOCYTE ESTERASE UR QL STRIP: ABNORMAL
NITRITE UR QL STRIP: POSITIVE
NON-SQ EPI CELLS URNS QL MICRO: ABNORMAL /HPF
PH UR STRIP.AUTO: 7.5 [PH]
PROT UR STRIP-MCNC: ABNORMAL MG/DL
RBC #/AREA URNS AUTO: ABNORMAL /HPF
SP GR UR STRIP.AUTO: 1.01 (ref 1–1.03)
UROBILINOGEN UR QL STRIP.AUTO: 0.2 E.U./DL
WBC #/AREA URNS AUTO: ABNORMAL /HPF

## 2020-02-27 PROCEDURE — 99217 PR OBSERVATION CARE DISCHARGE MANAGEMENT: CPT | Performed by: NURSE PRACTITIONER

## 2020-02-27 PROCEDURE — 87186 SC STD MICRODIL/AGAR DIL: CPT | Performed by: NURSE PRACTITIONER

## 2020-02-27 PROCEDURE — 81001 URINALYSIS AUTO W/SCOPE: CPT | Performed by: NURSE PRACTITIONER

## 2020-02-27 PROCEDURE — 87086 URINE CULTURE/COLONY COUNT: CPT | Performed by: NURSE PRACTITIONER

## 2020-02-27 PROCEDURE — 87077 CULTURE AEROBIC IDENTIFY: CPT | Performed by: NURSE PRACTITIONER

## 2020-02-27 RX ORDER — SULFAMETHOXAZOLE AND TRIMETHOPRIM 800; 160 MG/1; MG/1
1 TABLET ORAL EVERY 12 HOURS SCHEDULED
Qty: 14 TABLET | Refills: 0 | Status: SHIPPED | OUTPATIENT
Start: 2020-02-27 | End: 2020-03-05

## 2020-02-27 RX ORDER — SACCHAROMYCES BOULARDII 250 MG
250 CAPSULE ORAL 2 TIMES DAILY
Qty: 14 CAPSULE | Refills: 0 | Status: SHIPPED | OUTPATIENT
Start: 2020-02-27

## 2020-02-27 RX ORDER — ACETAMINOPHEN 325 MG/1
975 TABLET ORAL EVERY 8 HOURS SCHEDULED
Qty: 30 TABLET | Refills: 0 | Status: SHIPPED | OUTPATIENT
Start: 2020-02-27

## 2020-02-27 RX ORDER — SULFAMETHOXAZOLE AND TRIMETHOPRIM 800; 160 MG/1; MG/1
1 TABLET ORAL EVERY 12 HOURS SCHEDULED
Qty: 14 TABLET | Refills: 0 | Status: CANCELLED | OUTPATIENT
Start: 2020-02-27 | End: 2020-03-05

## 2020-02-27 RX ORDER — SULFAMETHOXAZOLE AND TRIMETHOPRIM 800; 160 MG/1; MG/1
1 TABLET ORAL EVERY 12 HOURS SCHEDULED
Status: DISCONTINUED | OUTPATIENT
Start: 2020-02-27 | End: 2020-02-27 | Stop reason: HOSPADM

## 2020-02-27 RX ADMIN — SULFAMETHOXAZOLE AND TRIMETHOPRIM 1 TABLET: 800; 160 TABLET ORAL at 12:23

## 2020-02-27 RX ADMIN — DEXTRAN 70 AND HYPROMELLOSE 2910 1 DROP: 1; 3 SOLUTION/ DROPS OPHTHALMIC at 10:00

## 2020-02-27 NOTE — PHYSICAL THERAPY NOTE
Physical Therapy Cancellation Note        Pt transitioning to home hospice services  Will d/c PT orders per CM        Gato Matson, PT

## 2020-02-27 NOTE — OCCUPATIONAL THERAPY NOTE
Occupational Therapy         Patient Name: Carlene Booth  PAZGW'X Date: 2/27/2020      Pt to be transferred to hospice services  Will d/c OT orders   Nadeem Watts OT

## 2020-02-27 NOTE — SOCIAL WORK
Set up transport to go home this evening with plan for home hospice services to start with [facility] tomorrow  Transport set up for 5:45 this evening with [facility]  [employer], no authorization needed for an in network transport  [facility] is in network with [email or web address]  Ref# [id number]  Notified the pts daughter at bedside of transport time  IMM letter signed  NO other needs identified

## 2020-02-27 NOTE — DISCHARGE SUMMARY
[address] [facility]  Discharge- Uri Abarca 1933, 80 y o  female MRN: [id number]    Unit/Bed#: [address] 204-01 Encounter: [id number]    Primary Care Provider: Edward Jalloh DO   Date and time admitted to hospital: 2/24/2020  7:46 PM        * Failure to thrive in adult  Assessment & Plan  · Failure to thrive, seen by Palliative Care and Hospice in January  Family declined services at that time  · Consult to Hospice appreciated  They met with daughter and son-in-law  She qualifies and consents signed  They will admit her when home on Friday    · Discharge home today with family    Dysphagia  Assessment & Plan  · On modified diet from previous evaluation with SLP in January  · Seen by SLP today for continued issues with eating / swallowing  · Now on pureed diet with thin liquids    Ischemic cardiomyopathy  Assessment & Plan  · S/p CRT-D placement   · Follows with Cardiology at [place] [id number] of thyroid gland  Assessment & Plan  · Continue levothyroxine    Knee pain  Assessment & Plan  · Xray without acute fracture or acute osseous abnormality   · Supportive care   · Avoid narcotics in setting of dementia and advanced age      Dementia [facility])  Assessment & Plan  · Patient with known dementia   · Oriented to person, place only     HTN (hypertension)  Assessment & Plan  · Continue home medications     CKD (chronic kidney disease)  Assessment & Plan  · Renal function at baseline     CHF (congestive heart failure) ([facility] [address] )  Assessment & Plan  Wt Readings from Last 3 Encounters:   02/24/20 57 8 kg (127 lb 6 8 oz)   12/11/14 71 2 kg (157 lb)   11/21/14 70 3 kg (155 lb)     · Appears euvolemic  · Continue home Lasix   · Last ECHO showed ECHO of 20-25%              Discharging Physician / Practitioner: Leobardo Hollis  PCP: Edward Jalloh DO  Admission Date:   Admission Orders (From admission, onward)     Ordered        02/24/20 2258  Place in Observation  Once                   Discharge Date: 02/27/20    Resolved Problems  Date Reviewed: 2/27/2020    None          Consultations During Hospital Stay:  · Hospice    Procedures Performed:   CT head without contrast   Final Result by Lynn Aj MD (02/24 2201)         1  No acute intracranial hemorrhage, mass effect or extra-axial collection  2   Posterior parietal occipital scalp hematoma  No acute calvarial fracture  Workstation performed: [id number]         CT spine cervical without contrast   Final Result by Lynn Aj MD (02/24 2157)      No acute cervical spine fracture or traumatic malalignment  Workstation performed: [id number]         XR knee 4+ vw right injury   ED Interpretation by Josette Bass MD (02/24 2051)   ED wet read: No fracture  Final Result by Lilli Arana MD (02/25 0848)      No acute osseous abnormality  Workstation performed: [id number]             Significant Findings / Test Results:   · None    Incidental Findings:   · None     Test Results Pending at Discharge (will require follow up): · None     Outpatient Tests Requested:  · None    Complications:  None    Reason for Admission:  [facility] Course:     Eva Woodruff is a 80 y o  female patient who originally presented to the hospital on 2/24/2020 due to knee pain  Patient resides with her daughter and apparently had suffered a fall and was complaining of knee pain  She normally ambulates with a walker however does require assistance  The patient's history is pertinent for dysphagia, congestive heart failure with most recent EF 20-25%, chronic kidney disease, hypertension, hypothyroidism, and dementia  The patient was evaluated in the emergency room and admitted as the daughter felt like she could not care for her mother at this time  She had initial x-rays and CT all of which were without acute findings  Her labs were stable    The patient was seen in consultation by speech therapy while here in her diet was downgraded to pureed with thin liquids due to her dysphagia  The patient was also seen in consultation by hospice and a plan was formed for the patient to return back to her daughter's home and admitted to hospice care  Today, the patient was found to have some continued lethargy and malodorous urine  A urinalysis was obtained which had significant pyuria  Antibiotic therapy has been initiated with Bactrim and will be continued upon discharge  The patient will be discharged today under the care of the daughter and hospice will be arriving at their home tomorrow for the admission  Please see above list of diagnoses and related plan for additional information  Condition at Discharge: stable     Discharge Day Visit / Exam:     Subjective:  Patient is poor historian  She denies pain  Vitals: Blood Pressure: 149/95 (02/27/20 0734)  Pulse: 79 (02/27/20 0734)  Temperature: (!) 97 4 °F (36 3 °C) (02/27/20 0734)  Temp Source: Oral (02/27/20 0734)  Respirations: 16 (02/27/20 0734)  Height: 5' 6" (167 6 cm) (02/24/20 1948)  Weight - Scale: 57 8 kg (127 lb 6 8 oz) (02/24/20 1948)  SpO2: 97 % (02/27/20 0734)  Exam:   Physical Exam   Constitutional: She is oriented to person, place, and time  She appears well-developed and well-nourished  No distress  HENT:   Head: Normocephalic and atraumatic  Eyes: Conjunctivae are normal  No scleral icterus  Cardiovascular: Normal rate, regular rhythm and normal heart sounds  No murmur heard  Pulmonary/Chest: Effort normal and breath sounds normal  No respiratory distress  She has no wheezes  She has no rales  Abdominal: Soft  Bowel sounds are normal  She exhibits no distension  There is no tenderness  Musculoskeletal: Normal range of motion  She exhibits no edema  Right knee: She exhibits swelling (mild)  Tenderness found  Neurological: She is alert and oriented to person, place, and time  Skin: Skin is warm and dry   She is not diaphoretic  Psychiatric: She has a normal mood and affect  Her behavior is normal    Nursing note and vitals reviewed  Discussion with Family:  Plan of care with daughter    Discharge instructions/Information to patient and family:   See after visit summary for information provided to patient and family  Provisions for Follow-Up Care:  See after visit summary for information related to follow-up care and any pertinent home health orders  Disposition:     Home with [facility]NA Services (Reminder: Complete face to face encounter)    For Discharges to [facility] SNF:   · Not Applicable to this Patient - Not Applicable to this Patient    Planned Readmission:  None     Discharge Statement:  I spent 60 minutes discharging the patient  This time was spent on the day of discharge  I had direct contact with the patient on the day of discharge  Greater than 50% of the total time was spent examining patient, answering all patient questions, arranging and discussing plan of care with patient as well as directly providing post-discharge instructions  Additional time then spent on discharge activities  Discharge Medications:  See after visit summary for reconciled discharge medications provided to patient and family        ** Please Note: This note has been constructed using a voice recognition system **

## 2020-02-27 NOTE — ASSESSMENT & PLAN NOTE
· Failure to thrive, seen by Palliative Care and Hospice in January  Family declined services at that time  · Consult to Hospice appreciated  They met with daughter and son-in-law  She qualifies and consents signed  They will admit her when home on Friday    · Discharge home today with family

## 2020-02-27 NOTE — NURSING NOTE
Patient was discharged to home on Hospice  IV was removed  Discharge instructions were reviewed with daughter prior to d/c  Patient was transported via stretcher

## 2020-02-27 NOTE — UTILIZATION REVIEW
Continued Stay Review    Date: 2/27/20                          Current Patient Class: OBS  [facility]    HPI:86 y o  female initially admitted on 2/24/20 @ 2258 to OBS  Approved for Home Hospice 2/26 with plan for DC 2/27    Assessment/Plan:  Transport set up for DC to home on [facility]  Pertinent Labs/Diagnostic Results:   Results from last 7 days   Lab Units 02/24/20  2312   WBC Thousand/uL 7 44   HEMOGLOBIN g/dL 11 4*   HEMATOCRIT % 34 2*   PLATELETS Thousands/uL 203     Results from last 7 days   Lab Units 02/25/20  0016   SODIUM mmol/L 138   POTASSIUM mmol/L 4 0   CHLORIDE mmol/L 101   CO2 mmol/L 30   ANION GAP mmol/L 7   BUN mg/dL 22   CREATININE mg/dL 1 02   EGFR ml/min/1 73sq m 50   CALCIUM mg/dL 9 6     Results from last 7 days   Lab Units 02/25/20  0016   GLUCOSE RANDOM mg/dL 147*     Vital Signs:   02/27/20 07:34:37  97 4 °F (36 3 °C)   79  16  149/95 97 % None (Room air) Lying   02/26/20 20:15:01  97 6 °F (36 4 °C)  75    139/70 96 %         Medications:   Scheduled Medications:  Medications:  acetaminophen 975 mg Oral Q8H EMILIE   aspirin 81 mg Oral Daily   atorvastatin 20 mg Oral Daily With Dinner   calcitriol 0 25 mcg Oral Once per day on Mon Wed Fri   carvedilol 3 125 mg Oral BID With Meals   dextran 70-hypromellose 1 drop Both Eyes Q12H EMILIE   DULoxetine 30 mg Oral Daily   ferrous sulfate 325 mg Oral HS   furosemide 10 mg Oral Daily   heparin (porcine) 5,000 Units Subcutaneous Q8H [facility] & intermediate   levothyroxine 50 mcg Oral Early Morning   magnesium oxide 400 mg Oral After Breakfast   pantoprazole 20 mg Oral Early Morning     Continuous IV Infusions:     PRN Meds:  calcium carbonate 1,000 mg Oral Daily PRN   menthol-methyl salicylate  Apply externally 4x Daily PRN   ondansetron 4 mg Intravenous Q6H PRN       Discharge Plan:  Hospice    Network Utilization Review Department  [email or web address]  ATTENTION: Please call with any questions or concerns to [phone] and carefully listen to the prompts so that you are directed to the right person  All voicemails are confidential   Agustin Man all requests for admission clinical reviews, approved or denied determinations and any other requests to dedicated fax number below belonging to the campus where the patient is receiving treatment   List of dedicated fax numbers for the Facilities:  [address] DENIALS (Administrative/Medical Necessity) [phone]   [address] (Maternity/NICU/Pediatrics) [phone]   Leni List [phone]   Eliseo Mae [phone]   Tasha Bryan [phone]   [facility] [phone]   [address] [place] [address] [address] [place] [address] [phone]   [facility]  [phone]   [address] [facility]  [address] [facility] [address] [facility] [phone]

## 2020-02-27 NOTE — TRANSPORTATION MEDICAL NECESSITY
Section I - General Information    Name of Patient: Jennifer Bettencourt                 : 1933    Medicare #: Fredy Henry  Transport Date: 20 (PCS is valid for round trips on this date and for all repetitive trips in the 60-day range as noted below )  Origin: [id number] [place]                                                         Destination: home  Is the pt's stay covered under Medicare Part A (PPS/DRG)   [x]     Closest appropriate facility? If no, why is transport to more distant facility required? Yes  If hospice pt, is this transport related to pt's terminal illness? Yes       Section II - Medical Necessity Questionnaire  Ambulance transportation is medically necessary only if other means of transport are contraindicated or would be potentially harmful to the patient  To meet this requirement, the patient must either be "bed confined" or suffer from a condition such that transport by means other than ambulance is contraindicated by the patient's condition  The following questions must be answered by the medical professional signing below for this form to be valid:    1)  Describe the MEDICAL CONDITION (physical and/or mental) of this patient AT [address] [place] [address] that requires the patient to be transported in an ambulance and why transport by other means is contraindicated by the patient's condition: Bed Bound, confused, end of life care    2) Is the patient "bed confined" as defined below? Yes  To be "be confined" the patient must satisfy all three of the following conditions: (1) unable to get up from bed without Assistance; AND (2) unable to ambulate; AND (3) unable to sit in a chair or wheelchair  3) Can this patient safely be transported by car or wheelchair van (i e , seated during transport without a medical attendant or monitoring)?    No    4) In addition to completing questions 1-3 above, please check any of the following conditions that apply*: *Note: supporting documentation for any boxes checked must be maintained in the patient's medical records  If hosp-hosp transfer, describe services needed at 2nd facility not available at 1st facility? Patient is confused  Moderate/severe pain on movement   Medical attendant required   Unable to tolerate seated position for time needed to transport       Section III - Signature of Physician or Healthcare Professional  I certify that the above information is true and correct based on my evaluation of this patient, and represent that the patient requires transport by ambulance and that other forms of transport are contraindicated  I understand that this information will be used by the Centers for Medicare and Medicaid Services (CMS) to support the determination of medical necessity for ambulance services, and I represent that I have personal knowledge of the patient's condition at time of transport  []  If this box is checked, I also certify that the patient is physically or mentally incapable of signing the ambulance service's claim and that the institution with which I am affiliated has furnished care, services, or assistance to the patient  My signature below is made on behalf of the patient pursuant to 42 CFR §424 36(b)(4)  In accordance with 42 CFR §424 37, the specific reason(s) that the patient is physically or mentally incapable of signing the claim form is as follows: Hill Schafer of Physician* or Healthcare Professional___Sonny Fishman MSW LSW  Signature Date 02/27/20 (For scheduled repetitive transports, this form is not valid for transports performed more than 60 days after this date)    Printed Name & Credentials of Physician or Healthcare Professional (MD, DO, RN, etc ) Toby Maldonado MSW LSW  *Form must be signed by patient's attending physician for scheduled, repetitive transports   For non-repetitive, unscheduled ambulance transports, if unable to obtain the signature of the attending physician, any of the following may sign (choose appropriate option below)  [] Physician Assistant []  Clinical Nurse Specialist []  Registered Nurse  []  Nurse Practitioner  [x] Discharge Planner

## 2020-02-27 NOTE — PLAN OF CARE
Problem: PAIN - ADULT  Goal: Verbalizes/displays adequate comfort level or baseline comfort level  Description  Interventions:  - Encourage patient to monitor pain and request assistance  - Assess pain using appropriate pain scale  - Administer analgesics based on type and severity of pain and evaluate response  - Implement non-pharmacological measures as appropriate and evaluate response  - Consider cultural and social influences on pain and pain management  - Notify physician/advanced practitioner if interventions unsuccessful or patient reports new pain  Outcome: Adequate for Discharge     Problem: INFECTION - ADULT  Goal: Absence or prevention of progression during hospitalization  Description  INTERVENTIONS:  - Assess and monitor for signs and symptoms of infection  - Monitor lab/diagnostic results  - Monitor all insertion sites, i e  indwelling lines, tubes, and drains  - Monitor endotracheal if appropriate and nasal secretions for changes in amount and color  - [place] appropriate cooling/warming therapies per order  - Administer medications as ordered  - Instruct and encourage patient and family to use good hand hygiene technique  - Identify and instruct in appropriate isolation precautions for identified infection/condition  Outcome: Adequate for Discharge  Goal: Absence of fever/infection during neutropenic period  Description  INTERVENTIONS:  - Monitor WBC    Outcome: Adequate for Discharge     Problem: SAFETY ADULT  Goal: Patient will remain free of falls  Description  INTERVENTIONS:  - Assess patient frequently for physical needs  -  Identify cognitive and physical deficits and behaviors that affect risk of falls    -  [place] fall precautions as indicated by assessment   - Educate patient/family on patient safety including physical limitations  - Instruct patient to call for assistance with activity based on assessment  - Modify environment to reduce risk of injury  - Consider OT/PT consult to assist with strengthening/mobility  Outcome: Adequate for Discharge  Goal: Maintain or return to baseline ADL function  Description  INTERVENTIONS:  -  Assess patient's ability to carry out ADLs; assess patient's baseline for ADL function and identify physical deficits which impact ability to perform ADLs (bathing, care of mouth/teeth, toileting, grooming, dressing, etc )  - Assess/evaluate cause of self-care deficits   - Assess range of motion  - Assess patient's mobility; develop plan if impaired  - Assess patient's need for assistive devices and provide as appropriate  - Encourage maximum independence but intervene and supervise when necessary  - Involve family in performance of ADLs  - Assess for home care needs following discharge   - Consider OT consult to assist with ADL evaluation and planning for discharge  - Provide patient education as appropriate  Outcome: Adequate for Discharge  Goal: Maintain or return mobility status to optimal level  Description  INTERVENTIONS:  - Assess patient's baseline mobility status (ambulation, transfers, stairs, etc )    - Identify cognitive and physical deficits and behaviors that affect mobility  - Identify mobility aids required to assist with transfers and/or ambulation (gait belt, sit-to-stand, lift, walker, cane, etc )  - [place] fall precautions as indicated by assessment  - Record patient progress and toleration of activity level on Mobility SBAR; progress patient to next Phase/Stage  - Instruct patient to call for assistance with activity based on assessment  - Consider rehabilitation consult to assist with strengthening/weightbearing, etc   Outcome: Adequate for Discharge     Problem: DISCHARGE PLANNING  Goal: Discharge to home or other facility with appropriate resources  Description  INTERVENTIONS:  - Identify barriers to discharge w/patient and caregiver  - Arrange for needed discharge resources and transportation as appropriate  - Identify discharge learning needs (meds, wound care, etc )  - Arrange for interpretive services to assist at discharge as needed  - Refer to Case Management Department for coordinating discharge planning if the patient needs post-hospital services based on physician/advanced practitioner order or complex needs related to functional status, cognitive ability, or social support system  Outcome: Adequate for Discharge     Problem: Knowledge Deficit  Goal: Patient/family/caregiver demonstrates understanding of disease process, treatment plan, medications, and discharge instructions  Description  Complete learning assessment and assess knowledge base  Interventions:  - Provide teaching at level of understanding  - Provide teaching via preferred learning methods  Outcome: Adequate for Discharge     Problem: Nutrition/Hydration-ADULT  Goal: Nutrient/Hydration intake appropriate for improving, restoring or maintaining nutritional needs  Description  Monitor and assess patient's nutrition/hydration status for malnutrition  Collaborate with interdisciplinary team and initiate plan and interventions as ordered  Monitor patient's weight and dietary intake as ordered or per policy  Utilize nutrition screening tool and intervene as necessary  Determine patient's food preferences and provide high-protein, high-caloric foods as appropriate       INTERVENTIONS:  - Monitor oral intake, urinary output, labs, and treatment plans  - Assess nutrition and hydration status and recommend course of action  - Evaluate amount of meals eaten  - Assist patient with eating if necessary   - Allow adequate time for meals  - Recommend/ encourage appropriate diets, oral nutritional supplements, and vitamin/mineral supplements  - Order, calculate, and assess calorie counts as needed  - Recommend, monitor, and adjust tube feedings and TPN/PPN based on assessed needs  - Assess need for intravenous fluids  - Provide specific nutrition/hydration education as appropriate  - Include patient/family/caregiver in decisions related to nutrition  Outcome: Adequate for Discharge     Problem: Prexisting or High Potential for Compromised Skin Integrity  Goal: Skin integrity is maintained or improved  Description  INTERVENTIONS:  - Identify patients at risk for skin breakdown  - Assess and monitor skin integrity  - Assess and monitor nutrition and hydration status  - Monitor labs   - Assess for incontinence   - Turn and reposition patient  - Assist with mobility/ambulation  - Relieve pressure over bony prominences  - Avoid friction and shearing  - Provide appropriate hygiene as needed including keeping skin clean and dry  - Evaluate need for skin moisturizer/barrier cream  - Collaborate with interdisciplinary team   - Patient/family teaching  - Consider wound care consult   Outcome: Adequate for Discharge     Problem: Potential for Falls  Goal: Patient will remain free of falls  Description  INTERVENTIONS:  - Assess patient frequently for physical needs  -  Identify cognitive and physical deficits and behaviors that affect risk of falls    -  [place] fall precautions as indicated by assessment   - Educate patient/family on patient safety including physical limitations  - Instruct patient to call for assistance with activity based on assessment  - Modify environment to reduce risk of injury  - Consider OT/PT consult to assist with strengthening/mobility  Outcome: Adequate for Discharge     Problem: MUSCULOSKELETAL - ADULT  Goal: Maintain or return mobility to safest level of function  Description  INTERVENTIONS:  - Assess patient's ability to carry out ADLs; assess patient's baseline for ADL function and identify physical deficits which impact ability to perform ADLs (bathing, care of mouth/teeth, toileting, grooming, dressing, etc )  - Assess/evaluate cause of self-care deficits   - Assess range of motion  - Assess patient's mobility  - Assess patient's need for assistive devices and provide as appropriate  - Encourage maximum independence but intervene and supervise when necessary  - Involve family in performance of ADLs  - Assess for home care needs following discharge   - Consider OT consult to assist with ADL evaluation and planning for discharge  - Provide patient education as appropriate  Outcome: Adequate for Discharge  Goal: Maintain proper alignment of affected body part  Description  INTERVENTIONS:  - Support, maintain and protect limb and body alignment  - Provide patient/ family with appropriate education  Outcome: Adequate for Discharge

## 2020-03-01 LAB — BACTERIA UR CULT: ABNORMAL

## 2020-05-20 ENCOUNTER — TELEPHONE (OUTPATIENT)
Dept: PALLIATIVE MEDICINE | Facility: CLINIC | Age: 85
End: 2020-05-20

## 2021-02-12 DIAGNOSIS — Z23 ENCOUNTER FOR IMMUNIZATION: ICD-10-CM
